# Patient Record
Sex: FEMALE | Race: WHITE | NOT HISPANIC OR LATINO | Employment: FULL TIME | URBAN - METROPOLITAN AREA
[De-identification: names, ages, dates, MRNs, and addresses within clinical notes are randomized per-mention and may not be internally consistent; named-entity substitution may affect disease eponyms.]

---

## 2019-01-16 ENCOUNTER — OFFICE VISIT (OUTPATIENT)
Dept: URGENT CARE | Facility: CLINIC | Age: 69
End: 2019-01-16
Payer: COMMERCIAL

## 2019-01-16 VITALS
DIASTOLIC BLOOD PRESSURE: 74 MMHG | OXYGEN SATURATION: 96 % | SYSTOLIC BLOOD PRESSURE: 159 MMHG | RESPIRATION RATE: 16 BRPM | TEMPERATURE: 98.2 F | HEART RATE: 86 BPM

## 2019-01-16 DIAGNOSIS — N30.01 ACUTE CYSTITIS WITH HEMATURIA: Primary | ICD-10-CM

## 2019-01-16 LAB
SL AMB  POCT GLUCOSE, UA: NEGATIVE
SL AMB LEUKOCYTE ESTERASE,UA: ABNORMAL
SL AMB POCT BILIRUBIN,UA: NEGATIVE
SL AMB POCT BLOOD,UA: ABNORMAL
SL AMB POCT CLARITY,UA: ABNORMAL
SL AMB POCT COLOR,UA: ABNORMAL
SL AMB POCT KETONES,UA: NEGATIVE
SL AMB POCT NITRITE,UA: NEGATIVE
SL AMB POCT PH,UA: 5
SL AMB POCT SPECIFIC GRAVITY,UA: 1.03
SL AMB POCT URINE PROTEIN: NEGATIVE
SL AMB POCT UROBILINOGEN: 0.2

## 2019-01-16 PROCEDURE — 87147 CULTURE TYPE IMMUNOLOGIC: CPT | Performed by: PHYSICIAN ASSISTANT

## 2019-01-16 PROCEDURE — 87077 CULTURE AEROBIC IDENTIFY: CPT | Performed by: PHYSICIAN ASSISTANT

## 2019-01-16 PROCEDURE — 99213 OFFICE O/P EST LOW 20 MIN: CPT | Performed by: PHYSICIAN ASSISTANT

## 2019-01-16 PROCEDURE — 87186 SC STD MICRODIL/AGAR DIL: CPT | Performed by: PHYSICIAN ASSISTANT

## 2019-01-16 PROCEDURE — 81002 URINALYSIS NONAUTO W/O SCOPE: CPT | Performed by: PHYSICIAN ASSISTANT

## 2019-01-16 PROCEDURE — 87086 URINE CULTURE/COLONY COUNT: CPT | Performed by: PHYSICIAN ASSISTANT

## 2019-01-16 RX ORDER — ESOMEPRAZOLE MAGNESIUM 40 MG/1
CAPSULE, DELAYED RELEASE ORAL
COMMUNITY

## 2019-01-16 RX ORDER — PHENAZOPYRIDINE HYDROCHLORIDE 100 MG/1
100 TABLET, FILM COATED ORAL 3 TIMES DAILY PRN
Qty: 10 TABLET | Refills: 0 | Status: SHIPPED | OUTPATIENT
Start: 2019-01-16

## 2019-01-16 RX ORDER — SPIRONOLACTONE 50 MG/1
TABLET, FILM COATED ORAL
COMMUNITY
Start: 2014-06-07

## 2019-01-16 RX ORDER — ROSUVASTATIN CALCIUM 10 MG/1
TABLET, COATED ORAL
COMMUNITY
Start: 2014-08-21

## 2019-01-16 RX ORDER — TAMOXIFEN CITRATE 20 MG/1
TABLET ORAL
COMMUNITY
Start: 2018-02-06

## 2019-01-16 RX ORDER — PRAMIPEXOLE 0.38 MG/1
TABLET, EXTENDED RELEASE ORAL
COMMUNITY
Start: 2016-09-13

## 2019-01-16 RX ORDER — PRAMIPEXOLE DIHYDROCHLORIDE 0.25 MG/1
TABLET ORAL
COMMUNITY

## 2019-01-16 RX ORDER — DIGOXIN 250 MCG
TABLET ORAL
COMMUNITY
Start: 2016-08-14

## 2019-01-16 RX ORDER — NITROFURANTOIN 25; 75 MG/1; MG/1
100 CAPSULE ORAL 2 TIMES DAILY
Qty: 14 CAPSULE | Refills: 0 | Status: SHIPPED | OUTPATIENT
Start: 2019-01-16 | End: 2019-01-23

## 2019-01-16 RX ORDER — ALPRAZOLAM 0.25 MG/1
TABLET ORAL
COMMUNITY
Start: 2018-07-15

## 2019-01-16 NOTE — PATIENT INSTRUCTIONS
Urinalysis performed in office-consistent with urinary tract infection  Urine will be sent for cultures  Prescription sent to pharmacy for Macrobid x7 days and Pyridium for recurrent urinary tract infections  Increase oral fluid intake  Follow up with PCP in 3-5 days  Proceed to  ER if symptoms worsen  Urinary Tract Infection in Women   AMBULATORY CARE:   A urinary tract infection (UTI)  is caused by bacteria that get inside your urinary tract  Most bacteria that enter your urinary tract come out when you urinate  If the bacteria stay in your urinary tract, you may get an infection  Your urinary tract includes your kidneys, ureters, bladder, and urethra  Urine is made in your kidneys, and it flows from the ureters to the bladder  Urine leaves the bladder through the urethra  A UTI is more common in your lower urinary tract, which includes your bladder and urethra  Common symptoms include the following:   · Urinating more often or waking from sleep to urinate    · Pain or burning when you urinate    · Pain or pressure in your lower abdomen     · Urine that smells bad    · Blood in your urine    · Leaking urine  Seek care immediately if:   · You are urinating very little or not at all  · You have a high fever with shaking chills  · You have side or back pain that gets worse  Contact your healthcare provider if:   · You have a fever  · You do not feel better after 2 days of taking antibiotics  · You are vomiting  · You have questions or concerns about your condition or care  Treatment for a UTI  may include medicines to treat a bacterial infection  You may also need medicines to decrease pain and burning, or decrease the urge to urinate often  Prevent a UTI:   · Empty your bladder often  Urinate and empty your bladder as soon as you feel the need  Do not hold your urine for long periods of time  · Wipe from front to back after you urinate or have a bowel movement    This will help prevent germs from getting into your urinary tract through your urethra  · Drink liquids as directed  Ask how much liquid to drink each day and which liquids are best for you  You may need to drink more liquids than usual to help flush out the bacteria  Do not drink alcohol, caffeine, or citrus juices  These can irritate your bladder and increase your symptoms  Your healthcare provider may recommend cranberry juice to help prevent a UTI  · Urinate after you have sex  This can help flush out bacteria passed during sex  · Do not douche or use feminine deodorants  These can change the chemical balance in your vagina  · Change sanitary pads or tampons often  This will help prevent germs from getting into your urinary tract  · Do pelvic muscle exercises often  Pelvic muscle exercises may help you start and stop urinating  Strong pelvic muscles may help you empty your bladder easier  Squeeze these muscles tightly for 5 seconds like you are trying to hold back urine  Then relax for 5 seconds  Gradually work up to squeezing for 10 seconds  Do 3 sets of 15 repetitions a day, or as directed  Follow up with your healthcare provider as directed:  Write down your questions so you remember to ask them during your visits  © 2017 [address] [employer] Information is for End User's use only and may not be sold, redistributed or otherwise used for commercial purposes  All illustrations and images included in CareNotes® are the copyrighted property of [employer]  or Arya Thurston  The above information is an  only  It is not intended as medical advice for individual conditions or treatments  Talk to your doctor, nurse or pharmacist before following any medical regimen to see if it is safe and effective for you

## 2019-01-16 NOTE — PROGRESS NOTES
[facility]        NAME: Corey Mobley is a 79 y o  female  : 1950    MRN: [id number]  DATE: 2019  TIME: 9:04 AM    Assessment and Plan   Acute cystitis with hematuria [N30 01]  1  Acute cystitis with hematuria  nitrofurantoin (MACROBID) 100 mg capsule    POCT urine dip    Urine culture    phenazopyridine (PYRIDIUM) 100 mg tablet         Patient Instructions   Urinalysis performed in office-consistent with urinary tract infection  Urine will be sent for cultures  Prescription sent to pharmacy for Macrobid x7 days and Pyridium for recurrent urinary tract infections  Increase oral fluid intake  Follow up with PCP in 3-5 days  Proceed to  ER if symptoms worsen  Chief Complaint     Chief Complaint   Patient presents with    Possible UTI     x 1 day, burning, pressure          History of Present Illness   The patient is a 74-year-old female who presents with urinary symptoms for 1 day  She states that she has had recurrent urinary tract infections and has been treated with antibiotics for approximately 6 UTIs this year  She does admit to urinary incontinence  Positive bladder spasms  She has never seen a urologist for these issues  Starting yesterday she developed urinary frequency, decreased urine output, pain and spasms with urination  She denies seeing any blood in her urine  Negative flank pain  Negative nausea or vomiting  Negative abdominal pain  Negative fever or chills  HPI    Review of Systems   Review of Systems   Constitutional: Negative for activity change, chills and fever  Gastrointestinal: Negative for abdominal distention, abdominal pain, blood in stool, constipation, diarrhea, nausea and vomiting  Genitourinary: Positive for decreased urine volume, dysuria, frequency and urgency   Negative for difficulty urinating, dyspareunia, flank pain, genital sores, hematuria, menstrual problem, pelvic pain, vaginal bleeding, vaginal discharge and vaginal pain    Musculoskeletal: Negative for arthralgias and myalgias  Skin: Negative for rash  Neurological: Negative for dizziness, light-headedness and headaches  All other systems reviewed and are negative  Current Medications       Current Outpatient Prescriptions:     ALPRAZolam (XANAX) 0 25 mg tablet, , Disp: , Rfl:     aspirin 81 MG tablet, Take by mouth, Disp: , Rfl:     digoxin (LANOXIN) 0 25 mg tablet, TAKE 1 TABLET BY MOUTH EVERY DAY, Disp: , Rfl:     esomeprazole (NEXIUM) 40 MG capsule, Take by mouth, Disp: , Rfl:     pramipexole (MIRAPEX) 0 25 mg tablet, Take by mouth, Disp: , Rfl:     Pramipexole Dihydrochloride ER 0 375 MG TB24, TAKE 1 TABLET BY MOUTH EVERY DAY, Disp: , Rfl:     rosuvastatin (CRESTOR) 10 MG tablet, Take by mouth, Disp: , Rfl:     spironolactone (ALDACTONE) 50 mg tablet, Take by mouth, Disp: , Rfl:     tamoxifen (NOLVADEX) 20 mg tablet, TAIKE 1 TABLET BY MOUTH DAILY, Disp: , Rfl:     nitrofurantoin (MACROBID) 100 mg capsule, Take 1 capsule (100 mg total) by mouth 2 (two) times a day for 7 days, Disp: 14 capsule, Rfl: 0    phenazopyridine (PYRIDIUM) 100 mg tablet, Take 1 tablet (100 mg total) by mouth 3 (three) times a day as needed for bladder spasms, Disp: 10 tablet, Rfl: 0    Current Allergies     Allergies as of 01/16/2019 - Reviewed 01/16/2019   Allergen Reaction Noted    Celecoxib  09/19/2016    Neomycin-bacitracin zn-polymyx Other (See Comments) 09/18/2014    Rofecoxib  09/18/2014    Tape  [medical tape]  09/18/2014    Tetanus antitoxin  09/18/2014            The following portions of the patient's history were reviewed and updated as appropriate: allergies, current medications, past family history, past medical history, past social history, past surgical history and problem list      History reviewed  No pertinent past medical history      Past Surgical History:   Procedure Laterality Date    MASTECTOMY Right 2016       No family history on file       Medications have been verified  Objective   /74 (BP Location: Left arm, Patient Position: Sitting, Cuff Size: Large)   Pulse 86   Temp 98 2 °F (36 8 °C) (Oral)   Resp 16   SpO2 96%        Physical Exam     Physical Exam   Constitutional: She appears well-developed and well-nourished  Non-toxic appearance  She does not have a sickly appearance  She does not appear ill  No distress  Abdominal: Soft  Normal appearance and bowel sounds are normal  She exhibits no shifting dullness, no distension, no pulsatile liver, no fluid wave, no abdominal bruit, no ascites, no pulsatile midline mass and no mass  There is no hepatosplenomegaly  There is no tenderness  There is no rigidity, no rebound, no guarding, no CVA tenderness, no tenderness at McBurney's point and negative Roger's sign  Skin: She is not diaphoretic  Nursing note and vitals reviewed

## 2019-01-18 LAB
BACTERIA UR CULT: ABNORMAL
BACTERIA UR CULT: ABNORMAL

## 2019-03-02 ENCOUNTER — OFFICE VISIT (OUTPATIENT)
Dept: URGENT CARE | Facility: CLINIC | Age: 69
End: 2019-03-02
Payer: COMMERCIAL

## 2019-03-02 VITALS
RESPIRATION RATE: 22 BRPM | OXYGEN SATURATION: 98 % | TEMPERATURE: 99.4 F | HEART RATE: 91 BPM | DIASTOLIC BLOOD PRESSURE: 65 MMHG | SYSTOLIC BLOOD PRESSURE: 150 MMHG

## 2019-03-02 DIAGNOSIS — J04.0 ACUTE LARYNGITIS: ICD-10-CM

## 2019-03-02 DIAGNOSIS — R09.82 POST-NASAL DRIP: ICD-10-CM

## 2019-03-02 DIAGNOSIS — J02.9 SORE THROAT: Primary | ICD-10-CM

## 2019-03-02 LAB — S PYO AG THROAT QL: NEGATIVE

## 2019-03-02 PROCEDURE — S9083 URGENT CARE CENTER GLOBAL: HCPCS | Performed by: NURSE PRACTITIONER

## 2019-03-02 PROCEDURE — 87430 STREP A AG IA: CPT | Performed by: NURSE PRACTITIONER

## 2019-03-02 PROCEDURE — G0382 LEV 3 HOSP TYPE B ED VISIT: HCPCS | Performed by: NURSE PRACTITIONER

## 2019-03-02 NOTE — PROGRESS NOTES
[facility]        NAME: Lucrecia Wilson is a 79 y o  female  : 1950    MRN: [id number]  DATE: 2019  TIME: 3:36 PM    Assessment and Plan   Sore throat [J02 9]  1  Sore throat  POCT rapid strepA   2  Acute laryngitis     3  Post-nasal drip           Patient Instructions   1  Flonase 1 spray each nostril daily   2  Throat lozenges, honey, salt water gargles for pain relief  3  Increase fluid intake   4  Tylenol/Motrin as needed for pain/fever  5  Follow up with your PCP for worsening or concerning symptoms     Follow up with PCP in 3-5 days  Proceed to  ER if symptoms worsen  Chief Complaint     Chief Complaint   Patient presents with    Cough     nasal congestion, sore throat x 6 days          History of Present Illness       Patient is a 42-year-old female presenting with a hoarse voice and sore throat for the past 6 days  Associated with decreased appetite today  She denies fever, chills, abdominal pain, nausea, vomiting, or diarrhea  She is not using any over-the-counter treatments for symptomatic relief  Review of Systems   Review of Systems   Constitutional: Positive for appetite change  Negative for activity change, chills and fever  HENT: Positive for postnasal drip, sore throat and voice change  Respiratory: Positive for cough  Negative for shortness of breath  Gastrointestinal: Negative for abdominal pain, diarrhea, nausea and vomiting  Skin: Negative for rash           Current Medications       Current Outpatient Medications:     ALPRAZolam (XANAX) 0 25 mg tablet, , Disp: , Rfl:     aspirin 81 MG tablet, Take by mouth, Disp: , Rfl:     digoxin (LANOXIN) 0 25 mg tablet, TAKE 1 TABLET BY MOUTH EVERY DAY, Disp: , Rfl:     esomeprazole (NEXIUM) 40 MG capsule, Take by mouth, Disp: , Rfl:     phenazopyridine (PYRIDIUM) 100 mg tablet, Take 1 tablet (100 mg total) by mouth 3 (three) times a day as needed for bladder spasms, Disp: 10 tablet, Rfl: 0   pramipexole (MIRAPEX) 0 25 mg tablet, Take by mouth, Disp: , Rfl:     Pramipexole Dihydrochloride ER 0 375 MG TB24, TAKE 1 TABLET BY MOUTH EVERY DAY, Disp: , Rfl:     rosuvastatin (CRESTOR) 10 MG tablet, Take by mouth, Disp: , Rfl:     spironolactone (ALDACTONE) 50 mg tablet, Take by mouth, Disp: , Rfl:     tamoxifen (NOLVADEX) 20 mg tablet, TAIKE 1 TABLET BY MOUTH DAILY, Disp: , Rfl:     Current Allergies     Allergies as of 03/02/2019 - Reviewed 03/02/2019   Allergen Reaction Noted    Celecoxib  09/19/2016    Neomycin-bacitracin zn-polymyx Other (See Comments) 09/18/2014    Rofecoxib  09/18/2014    Tape  [medical tape]  09/18/2014    Tetanus antitoxin  09/18/2014            The following portions of the patient's history were reviewed and updated as appropriate: allergies, current medications, past family history, past medical history, past social history, past surgical history and problem list      History reviewed  No pertinent past medical history  Past Surgical History:   Procedure Laterality Date    MASTECTOMY Right 2016       Family History   Problem Relation Age of Onset    Arthritis Mother     Heart disease Father          Medications have been verified  Objective   /65 (BP Location: Right arm, Patient Position: Sitting, Cuff Size: Large)   Pulse 91   Temp 99 4 °F (37 4 °C) (Tympanic)   Resp 22   SpO2 98%       rapid strep:  Negative  Physical Exam     Physical Exam   Constitutional: She is oriented to person, place, and time  Vital signs are normal  She appears well-developed and well-nourished  She is active  No distress  HENT:   Head: Normocephalic  Right Ear: Tympanic membrane, external ear and ear canal normal    Left Ear: Tympanic membrane, external ear and ear canal normal    Nose: Nose normal    Mouth/Throat: Oropharynx is clear and moist and mucous membranes are normal  No oropharyngeal exudate, posterior oropharyngeal edema or posterior oropharyngeal erythema  Eyes: Pupils are equal, round, and reactive to light  Conjunctivae are normal    Cardiovascular: Normal rate, regular rhythm, S1 normal, S2 normal and normal heart sounds  No murmur heard  Pulmonary/Chest: Breath sounds normal  No respiratory distress  She has no decreased breath sounds  She has no wheezes  She has no rhonchi  She has no rales  Neurological: She is alert and oriented to person, place, and time  GCS eye subscore is 4  GCS verbal subscore is 5  GCS motor subscore is 6  Skin: Skin is warm and dry

## 2019-03-02 NOTE — PATIENT INSTRUCTIONS
1  Flonase 1 spray each nostril daily   2  Throat lozenges, honey, salt water gargles for pain relief  3  Increase fluid intake   4  Tylenol/Motrin as needed for pain/fever  5  Follow up with your PCP for worsening or concerning symptoms     Postnasal Drip   AMBULATORY CARE:   Postnasal drip  is a condition that causes a large amount of mucus to collect in your throat or nose  It may also be called upper airway cough syndrome because the mucus causes repeated coughing  You may have a sore throat, or throat tissues may swell  This may feel like a lump in your throat  You may also feel like you need to clear your throat often  Contact your healthcare provider if:   · You have trouble breathing because of the mucus  · You have new or worsening symptoms, even with treatment  · You have signs of an infection, such as yellow or green mucus, or a fever  · You have questions or concerns about your condition or care  Treatment  may include any of the following:  · Medicines  may be given to thin the mucus  You may need to swallow the medicine or use a device to flush your sinuses with liquid squirted into your nose  Nasal sprays may also be needed to keep the tissues in your nose moist  Medicines can also relieve congestion  Allergy medicine may help if your symptoms are caused by seasonal allergies, such as hay fever  You may need medicine to help control GERD  · Antibiotics  may be needed to treat a bacterial infection  Manage postnasal drip:   · Use a humidifier or vaporizer  Use a cool mist humidifier or a vaporizer to increase air moisture in your home  This may make it easier for you to breathe  · Drink more liquids as directed  Liquids help keep your air passages moist and help you cough up mucus  Ask how much liquid to drink each day and which liquids are best for you  · Avoid cold air and dry, heated air  Cold or dry air can trigger postnasal drip   Try to stay inside on cold days, or keep your mouth covered  Do not stay long in areas that have dry, heated air  · Do not smoke, and avoid secondhand smoke  Nicotine and other chemicals in cigarettes and cigars can irritate your throat and make coughing worse  Ask your healthcare provider for information if you currently smoke and need help to quit  E-cigarettes or smokeless tobacco still contain nicotine  Talk to your healthcare provider before you use these products  Follow up with your healthcare provider as directed:  Write down your questions so you remember to ask them during your visits  © 2017 [facility] Information is for End User's use only and may not be sold, redistributed or otherwise used for commercial purposes  All illustrations and images included in CareNotes® are the copyrighted property of A [employer] M [employer] Inc  or Arya Thurston  The above information is an  only  It is not intended as medical advice for individual conditions or treatments  Talk to your doctor, nurse or pharmacist before following any medical regimen to see if it is safe and effective for you

## 2019-05-04 ENCOUNTER — APPOINTMENT (OUTPATIENT)
Dept: RADIOLOGY | Facility: CLINIC | Age: 69
End: 2019-05-04
Payer: COMMERCIAL

## 2019-05-04 ENCOUNTER — TRANSCRIBE ORDERS (OUTPATIENT)
Dept: URGENT CARE | Facility: CLINIC | Age: 69
End: 2019-05-04

## 2019-05-04 DIAGNOSIS — J18.9 PNEUMONIA OF LEFT LUNG DUE TO INFECTIOUS ORGANISM, UNSPECIFIED PART OF LUNG: ICD-10-CM

## 2019-05-04 DIAGNOSIS — J18.9 PNEUMONIA OF LEFT LUNG DUE TO INFECTIOUS ORGANISM, UNSPECIFIED PART OF LUNG: Primary | ICD-10-CM

## 2019-05-04 PROCEDURE — 71046 X-RAY EXAM CHEST 2 VIEWS: CPT

## 2019-05-13 ENCOUNTER — TRANSCRIBE ORDERS (OUTPATIENT)
Dept: LAB | Facility: CLINIC | Age: 69
End: 2019-05-13

## 2019-05-13 ENCOUNTER — APPOINTMENT (OUTPATIENT)
Dept: LAB | Facility: CLINIC | Age: 69
End: 2019-05-13
Payer: COMMERCIAL

## 2019-05-13 DIAGNOSIS — I48.0 PAROXYSMAL ATRIAL FIBRILLATION (HCC): ICD-10-CM

## 2019-05-13 DIAGNOSIS — F33.1 MAJOR DEPRESSIVE DISORDER, RECURRENT EPISODE, MODERATE (HCC): ICD-10-CM

## 2019-05-13 DIAGNOSIS — I10 ESSENTIAL HYPERTENSION, MALIGNANT: ICD-10-CM

## 2019-05-13 DIAGNOSIS — J04.0 ACUTE EDEMATOUS LARYNGITIS: ICD-10-CM

## 2019-05-13 DIAGNOSIS — F45.8 ANXIETY HYPERVENTILATION: ICD-10-CM

## 2019-05-13 DIAGNOSIS — F41.9 ANXIETY HYPERVENTILATION: ICD-10-CM

## 2019-05-13 DIAGNOSIS — H66.41 SUPPURATIVE OTITIS MEDIA OF RIGHT EAR, UNSPECIFIED CHRONICITY: ICD-10-CM

## 2019-05-13 DIAGNOSIS — H66.41 SUPPURATIVE OTITIS MEDIA OF RIGHT EAR, UNSPECIFIED CHRONICITY: Primary | ICD-10-CM

## 2019-05-13 LAB
25(OH)D3 SERPL-MCNC: 23.3 NG/ML (ref 30–100)
ALBUMIN SERPL BCP-MCNC: 3.4 G/DL (ref 3.5–5)
ALP SERPL-CCNC: 62 U/L (ref 46–116)
ALT SERPL W P-5'-P-CCNC: 54 U/L (ref 12–78)
ANION GAP SERPL CALCULATED.3IONS-SCNC: 6 MMOL/L (ref 4–13)
AST SERPL W P-5'-P-CCNC: 50 U/L (ref 5–45)
BASOPHILS # BLD AUTO: 0.06 THOUSANDS/ΜL (ref 0–0.1)
BASOPHILS NFR BLD AUTO: 1 % (ref 0–1)
BILIRUB SERPL-MCNC: 0.48 MG/DL (ref 0.2–1)
BUN SERPL-MCNC: 12 MG/DL (ref 5–25)
CALCIUM SERPL-MCNC: 8.5 MG/DL (ref 8.3–10.1)
CHLORIDE SERPL-SCNC: 114 MMOL/L (ref 100–108)
CHOLEST SERPL-MCNC: 111 MG/DL (ref 50–200)
CO2 SERPL-SCNC: 23 MMOL/L (ref 21–32)
CREAT SERPL-MCNC: 0.73 MG/DL (ref 0.6–1.3)
DIGOXIN SERPL-MCNC: 0.5 NG/ML (ref 0.8–2)
EOSINOPHIL # BLD AUTO: 0.12 THOUSAND/ΜL (ref 0–0.61)
EOSINOPHIL NFR BLD AUTO: 2 % (ref 0–6)
ERYTHROCYTE [DISTWIDTH] IN BLOOD BY AUTOMATED COUNT: 14 % (ref 11.6–15.1)
GFR SERPL CREATININE-BSD FRML MDRD: 86 ML/MIN/1.73SQ M
GLUCOSE P FAST SERPL-MCNC: 101 MG/DL (ref 65–99)
HCT VFR BLD AUTO: 40.7 % (ref 34.8–46.1)
HDLC SERPL-MCNC: 48 MG/DL (ref 40–60)
HGB BLD-MCNC: 13.2 G/DL (ref 11.5–15.4)
IMM GRANULOCYTES # BLD AUTO: 0.03 THOUSAND/UL (ref 0–0.2)
IMM GRANULOCYTES NFR BLD AUTO: 0 % (ref 0–2)
LDLC SERPL CALC-MCNC: 49 MG/DL (ref 0–100)
LYMPHOCYTES # BLD AUTO: 2.16 THOUSANDS/ΜL (ref 0.6–4.47)
LYMPHOCYTES NFR BLD AUTO: 27 % (ref 14–44)
MCH RBC QN AUTO: 28.9 PG (ref 26.8–34.3)
MCHC RBC AUTO-ENTMCNC: 32.4 G/DL (ref 31.4–37.4)
MCV RBC AUTO: 89 FL (ref 82–98)
MONOCYTES # BLD AUTO: 0.32 THOUSAND/ΜL (ref 0.17–1.22)
MONOCYTES NFR BLD AUTO: 4 % (ref 4–12)
NEUTROPHILS # BLD AUTO: 5.26 THOUSANDS/ΜL (ref 1.85–7.62)
NEUTS SEG NFR BLD AUTO: 66 % (ref 43–75)
NONHDLC SERPL-MCNC: 63 MG/DL
NRBC BLD AUTO-RTO: 0 /100 WBCS
PLATELET # BLD AUTO: 145 THOUSANDS/UL (ref 149–390)
PMV BLD AUTO: 11.9 FL (ref 8.9–12.7)
POTASSIUM SERPL-SCNC: 4.1 MMOL/L (ref 3.5–5.3)
PROT SERPL-MCNC: 7 G/DL (ref 6.4–8.2)
RBC # BLD AUTO: 4.57 MILLION/UL (ref 3.81–5.12)
SODIUM SERPL-SCNC: 143 MMOL/L (ref 136–145)
TRIGL SERPL-MCNC: 71 MG/DL
TSH SERPL DL<=0.05 MIU/L-ACNC: 2.8 UIU/ML (ref 0.36–3.74)
WBC # BLD AUTO: 7.95 THOUSAND/UL (ref 4.31–10.16)

## 2019-05-13 PROCEDURE — 82306 VITAMIN D 25 HYDROXY: CPT

## 2019-05-13 PROCEDURE — 80061 LIPID PANEL: CPT

## 2019-05-13 PROCEDURE — 84443 ASSAY THYROID STIM HORMONE: CPT

## 2019-05-13 PROCEDURE — 36415 COLL VENOUS BLD VENIPUNCTURE: CPT

## 2019-05-13 PROCEDURE — 85025 COMPLETE CBC W/AUTO DIFF WBC: CPT

## 2019-05-13 PROCEDURE — 80053 COMPREHEN METABOLIC PANEL: CPT

## 2019-05-13 PROCEDURE — 80162 ASSAY OF DIGOXIN TOTAL: CPT

## 2020-08-10 ENCOUNTER — TRANSCRIBE ORDERS (OUTPATIENT)
Dept: LAB | Facility: CLINIC | Age: 70
End: 2020-08-10

## 2020-08-10 ENCOUNTER — APPOINTMENT (OUTPATIENT)
Dept: LAB | Facility: CLINIC | Age: 70
End: 2020-08-10
Payer: COMMERCIAL

## 2020-08-10 DIAGNOSIS — Z17.0 ESTROGEN RECEPTOR POSITIVE: ICD-10-CM

## 2020-08-10 DIAGNOSIS — C50.511 MALIGNANT NEOPLASM OF LOWER-OUTER QUADRANT OF RIGHT FEMALE BREAST, UNSPECIFIED ESTROGEN RECEPTOR STATUS (HCC): Primary | ICD-10-CM

## 2020-08-10 DIAGNOSIS — C50.511 MALIGNANT NEOPLASM OF LOWER-OUTER QUADRANT OF RIGHT FEMALE BREAST, UNSPECIFIED ESTROGEN RECEPTOR STATUS (HCC): ICD-10-CM

## 2020-08-10 LAB
ALBUMIN SERPL BCP-MCNC: 3.5 G/DL (ref 3.5–5)
ALP SERPL-CCNC: 74 U/L (ref 46–116)
ALT SERPL W P-5'-P-CCNC: 66 U/L (ref 12–78)
ANION GAP SERPL CALCULATED.3IONS-SCNC: 7 MMOL/L (ref 4–13)
AST SERPL W P-5'-P-CCNC: 72 U/L (ref 5–45)
BACTERIA UR QL AUTO: ABNORMAL /HPF
BASOPHILS # BLD AUTO: 0.08 THOUSANDS/ΜL (ref 0–0.1)
BASOPHILS NFR BLD AUTO: 1 % (ref 0–1)
BILIRUB SERPL-MCNC: 0.46 MG/DL (ref 0.2–1)
BILIRUB UR QL STRIP: NEGATIVE
BUN SERPL-MCNC: 8 MG/DL (ref 5–25)
CALCIUM SERPL-MCNC: 8.7 MG/DL (ref 8.3–10.1)
CANCER AG27-29 SERPL-ACNC: 12.2 U/ML (ref 0–42.3)
CHLORIDE SERPL-SCNC: 108 MMOL/L (ref 100–108)
CLARITY UR: CLEAR
CO2 SERPL-SCNC: 24 MMOL/L (ref 21–32)
COLOR UR: YELLOW
CREAT SERPL-MCNC: 0.74 MG/DL (ref 0.6–1.3)
EOSINOPHIL # BLD AUTO: 0.17 THOUSAND/ΜL (ref 0–0.61)
EOSINOPHIL NFR BLD AUTO: 2 % (ref 0–6)
ERYTHROCYTE [DISTWIDTH] IN BLOOD BY AUTOMATED COUNT: 13.8 % (ref 11.6–15.1)
GFR SERPL CREATININE-BSD FRML MDRD: 84 ML/MIN/1.73SQ M
GGT SERPL-CCNC: 60 U/L (ref 5–85)
GLUCOSE P FAST SERPL-MCNC: 111 MG/DL (ref 65–99)
GLUCOSE UR STRIP-MCNC: NEGATIVE MG/DL
HCT VFR BLD AUTO: 44.9 % (ref 34.8–46.1)
HGB BLD-MCNC: 14.4 G/DL (ref 11.5–15.4)
HGB UR QL STRIP.AUTO: NEGATIVE
HYALINE CASTS #/AREA URNS LPF: ABNORMAL /LPF
IMM GRANULOCYTES # BLD AUTO: 0.03 THOUSAND/UL (ref 0–0.2)
IMM GRANULOCYTES NFR BLD AUTO: 0 % (ref 0–2)
KETONES UR STRIP-MCNC: NEGATIVE MG/DL
LEUKOCYTE ESTERASE UR QL STRIP: NEGATIVE
LYMPHOCYTES # BLD AUTO: 3.31 THOUSANDS/ΜL (ref 0.6–4.47)
LYMPHOCYTES NFR BLD AUTO: 32 % (ref 14–44)
MCH RBC QN AUTO: 29.3 PG (ref 26.8–34.3)
MCHC RBC AUTO-ENTMCNC: 32.1 G/DL (ref 31.4–37.4)
MCV RBC AUTO: 91 FL (ref 82–98)
MONOCYTES # BLD AUTO: 0.56 THOUSAND/ΜL (ref 0.17–1.22)
MONOCYTES NFR BLD AUTO: 6 % (ref 4–12)
NEUTROPHILS # BLD AUTO: 6.11 THOUSANDS/ΜL (ref 1.85–7.62)
NEUTS SEG NFR BLD AUTO: 59 % (ref 43–75)
NITRITE UR QL STRIP: NEGATIVE
NON-SQ EPI CELLS URNS QL MICRO: ABNORMAL /HPF
NRBC BLD AUTO-RTO: 0 /100 WBCS
PH UR STRIP.AUTO: 6 [PH]
PLATELET # BLD AUTO: 155 THOUSANDS/UL (ref 149–390)
PMV BLD AUTO: 13 FL (ref 8.9–12.7)
POTASSIUM SERPL-SCNC: 4.3 MMOL/L (ref 3.5–5.3)
PROT SERPL-MCNC: 7.3 G/DL (ref 6.4–8.2)
PROT UR STRIP-MCNC: ABNORMAL MG/DL
RBC # BLD AUTO: 4.91 MILLION/UL (ref 3.81–5.12)
RBC #/AREA URNS AUTO: ABNORMAL /HPF
SODIUM SERPL-SCNC: 139 MMOL/L (ref 136–145)
SP GR UR STRIP.AUTO: 1.02 (ref 1–1.03)
UROBILINOGEN UR QL STRIP.AUTO: 0.2 E.U./DL
WBC # BLD AUTO: 10.26 THOUSAND/UL (ref 4.31–10.16)
WBC #/AREA URNS AUTO: ABNORMAL /HPF

## 2020-08-10 PROCEDURE — 81001 URINALYSIS AUTO W/SCOPE: CPT

## 2020-08-10 PROCEDURE — 36415 COLL VENOUS BLD VENIPUNCTURE: CPT

## 2020-08-10 PROCEDURE — 87186 SC STD MICRODIL/AGAR DIL: CPT

## 2020-08-10 PROCEDURE — 82977 ASSAY OF GGT: CPT

## 2020-08-10 PROCEDURE — 80053 COMPREHEN METABOLIC PANEL: CPT

## 2020-08-10 PROCEDURE — 87086 URINE CULTURE/COLONY COUNT: CPT

## 2020-08-10 PROCEDURE — 85025 COMPLETE CBC W/AUTO DIFF WBC: CPT

## 2020-08-10 PROCEDURE — 86300 IMMUNOASSAY TUMOR CA 15-3: CPT

## 2020-08-10 PROCEDURE — 87077 CULTURE AEROBIC IDENTIFY: CPT

## 2020-08-13 LAB — BACTERIA UR CULT: ABNORMAL

## 2021-03-07 ENCOUNTER — NURSE TRIAGE (OUTPATIENT)
Dept: OTHER | Facility: OTHER | Age: 71
End: 2021-03-07

## 2021-03-07 DIAGNOSIS — Z20.828 EXPOSURE TO SARS VIRUS: ICD-10-CM

## 2021-03-07 DIAGNOSIS — Z20.828 EXPOSURE TO SARS VIRUS: Primary | ICD-10-CM

## 2021-03-07 PROCEDURE — U0005 INFEC AGEN DETEC AMPLI PROBE: HCPCS | Performed by: FAMILY MEDICINE

## 2021-03-07 PROCEDURE — U0003 INFECTIOUS AGENT DETECTION BY NUCLEIC ACID (DNA OR RNA); SEVERE ACUTE RESPIRATORY SYNDROME CORONAVIRUS 2 (SARS-COV-2) (CORONAVIRUS DISEASE [COVID-19]), AMPLIFIED PROBE TECHNIQUE, MAKING USE OF HIGH THROUGHPUT TECHNOLOGIES AS DESCRIBED BY CMS-2020-01-R: HCPCS | Performed by: FAMILY MEDICINE

## 2021-03-07 NOTE — TELEPHONE ENCOUNTER
Reason for Disposition   [1] CLOSE CONTACT COVID-19 EXPOSURE within last 14 days AND [2] NO symptoms    Answer Assessment - Initial Assessment Questions  1  Were you within 6 feet or less, for up to 15 minutes or more with a person that has a confirmed COVID-19 test?   Yes exposed to daughter    2  What was the date of your exposure? Pt lives in same household  3 Are you experiencing any symptoms attributed to the virus? (Assess for SOB, cough, fever, difficulty breathing)   Denies any symptoms     4  HIGH RISK: Do you have any history heart or lung conditions, weakened immune system, diabetes, Asthma, CHF, HIV, COPD, Chemo, renal failure, sickle cell, etc?   Denies    Protocols used: CORONAVIRUS (COVID-19) EXPOSURE-ADULT-AH    Pt states that the Department of Health told her that she needs to get tested today since she was exposed to daughter  Covid swab placed

## 2021-03-07 NOTE — TELEPHONE ENCOUNTER
Regarding: covid test request- asymptomatic-contact  ----- Message from [facility] sent at 3/7/2021 11:54 AM EST -----  "My mom was in direct contact with a covid positive patient  At this time no symptoms  "

## 2021-03-09 LAB — SARS-COV-2 N GENE RESP QL NAA+PROBE: NEGATIVE

## 2021-03-10 DIAGNOSIS — Z23 ENCOUNTER FOR IMMUNIZATION: ICD-10-CM

## 2021-05-24 ENCOUNTER — AMB VIDEO VISIT (OUTPATIENT)
Dept: OTHER | Facility: HOSPITAL | Age: 71
End: 2021-05-24
Payer: COMMERCIAL

## 2021-05-24 PROCEDURE — 99212 OFFICE O/P EST SF 10 MIN: CPT | Performed by: FAMILY MEDICINE

## 2021-05-24 NOTE — CARE ANYWHERE EVISITS
Visit Summary for Radha Jhaveri - Gender: Female - Date of Birth: [id number]  Date: [id number] - Duration: 9 minutes  Patient: Jose Jhaveri  Provider: Kaci Sunshine    Patient Contact Information  Address  [address]; [address] [facility] [address] [facility] [address]  [phone]    Visit Topics  My eyes are burning, itchy  red, and liquid is coming out : other [Added By: Self - 2021-05-24]    Triage Questions   What is your current physical address in the event of a medical emergency? Answer [address]  Are you allergic to any medications? Answer [Vioxx]  Are you now or could you be pregnant? Answer [No]  Do you have any immune system   compromise or chronic lung disease? Answer [No]  Do you have any vulnerable family members in the home (infant, pregnant, cancer, elderly)? Answer [No]     Conversation Transcripts  [0A][0A] [Notification] You are connected with Kaci Sunshine, Family Physician [0A][Notification] Radha Jhaveri is located in [place]  [0A][Notification] Radha Jhaveri has shared health history  Anmol Ceballos  [0A][Notification] Kaci Sunshine   has added a prescription [0A][Notification] Kaci Sunshine has added a prescription [0A][Notification] Kaci Sunshine has added a prescription  [0A]    Diagnosis  Acute atopic conjunctivitis, bilateral    Procedures  Value: [id number] Code: CPT-4 UNLISTED E&M SERVICE    Medications Prescribed    Benadryl  Dose : 1 capsule  Route : oral  Frequency : every day  Until directed to stop  Refills : 1  Instructions to the Pharmacist : Substitutions allowed    fluticasone  Dose : 2 sprays  Route : nasal  Frequency : every day  Until directed to stop  Refills : 0  Instructions to the Pharmacist : 1 ml = 1 unit  generic ok  Substitutions allowed    Pataday  Dose : 1 drop  Route : ophthalmic  Frequency : every day  Until directed to stop  Refills : 0  Instructions to the Pharmacist : generic ok  5 ml = 1 unit please    Substitutions allowed    Benadryl Allergy/Cold      Frequency :           Detrol LA      Frequency :           Norvasc      Frequency :           spironolactone      Frequency :           digoxin      Frequency :           aspirin      Frequency :           Cosamin DS      Frequency :           Colace      Frequency :           Crestor      Frequency :           pramipexole      Frequency :             Provider Notes  [0A][0A] Video visit[0A][0A][0A][0A]S: Watery, itchy eyes started last night  No purulent discharge  Also runny nose  No fever  No change in vision  [0A]She had been taking benadryl every night but she stopped it last night    [0A][0A][0A][0A][0A][0A]PMH: htn, hyperlipidemia, a fib, restless leg[0A][0A][0A][0A]Meds: norvasc, spironolactone, crestor, detrol , effexor [0A]TAMOXIFEN CITRATE 20 MG TABSPRAMIPEXOLE DIHYDROCHLORIDE[0A][0A][0A]Allergies: viox, celebrex, neosporin   cream, adhesive tape [0A][0A][0A][0A]O:  Alert, in no apparent distress[0A][0A]Talking and breathing normally  No respiratory distress  [0A][0A]Conjunctivae mildly injected  EOMI  Watery eyes  [0A][0A]No facial asymmetry  [0A][0A]Nose, lips   normal [0A][0A][0A][0A]A: Allergic conjunctivitis [0A][0A][0A][0A]P:  [0A][0A]Please follow up with another online visit or with your primary care provider, urgent care or ER if your symptoms change, persist or worsen  [0A][0A][0A][0A]I recommend that you   have an in-person exam with your primary care provider annually  [0A][0A]I recommend that you share a copy of this visit with your primary care provider to be included in your medical records  [0A][0A][0A][0A]Please call the pharmacy help line at [phone] if you have any questions about your prescriptions  [0A]    Electronically signed Devonte JimNPI [id number])

## 2021-05-26 ENCOUNTER — OFFICE VISIT (OUTPATIENT)
Dept: URGENT CARE | Facility: CLINIC | Age: 71
End: 2021-05-26
Payer: COMMERCIAL

## 2021-05-26 VITALS
SYSTOLIC BLOOD PRESSURE: 143 MMHG | RESPIRATION RATE: 16 BRPM | TEMPERATURE: 98.8 F | DIASTOLIC BLOOD PRESSURE: 64 MMHG | HEIGHT: 67 IN | WEIGHT: 230 LBS | BODY MASS INDEX: 36.1 KG/M2 | HEART RATE: 86 BPM

## 2021-05-26 DIAGNOSIS — L23.7 ALLERGIC CONTACT DERMATITIS DUE TO PLANTS, EXCEPT FOOD: ICD-10-CM

## 2021-05-26 DIAGNOSIS — H10.9 CONJUNCTIVITIS OF BOTH EYES, UNSPECIFIED CONJUNCTIVITIS TYPE: Primary | ICD-10-CM

## 2021-05-26 PROCEDURE — 99213 OFFICE O/P EST LOW 20 MIN: CPT | Performed by: NURSE PRACTITIONER

## 2021-05-26 RX ORDER — SULFACETAMIDE/PREDNISOLONE 10 %-0.2 %
1 SUSPENSION, DROPS(FINAL DOSAGE FORM)(ML) OPHTHALMIC (EYE)
Qty: 10 ML | Refills: 0 | Status: SHIPPED | OUTPATIENT
Start: 2021-05-26

## 2021-05-26 RX ORDER — VENLAFAXINE HYDROCHLORIDE 75 MG/1
75 CAPSULE, EXTENDED RELEASE ORAL DAILY
COMMUNITY

## 2021-05-26 RX ORDER — PREDNISONE 10 MG/1
TABLET ORAL
Qty: 24 TABLET | Refills: 0 | Status: SHIPPED | OUTPATIENT
Start: 2021-05-26

## 2021-05-26 NOTE — PATIENT INSTRUCTIONS
--Warm/cool compresses  --Rx eye drops and oral steroid as ordered  --Continue Zyrtec and Pataday eye drops as ordered previously  --Avoid further exposure with plants, other known precipitants  --F/u with eye doctor right away if no improvement/worsening over the next 24-48 hours with these measures

## 2021-05-26 NOTE — PROGRESS NOTES
[facility]        NAME: Saad Paz is a 78 y o  female  : 1950    MRN: [id number]  DATE: May 26, 2021  TIME: 4:28 PM    Assessment and Plan   Conjunctivitis of both eyes, unspecified conjunctivitis type [H10 9]  1  Conjunctivitis of both eyes, unspecified conjunctivitis type  predniSONE 10 mg tablet    sulfacetamide-prednisolone (BLEPHAMIDE) 10-0 2 % ophthalmic suspension   2  Allergic contact dermatitis due to plants, except food  predniSONE 10 mg tablet     --Suspect allergic conjunctivitis/contact dermatitis from plant exposure  --Hx neosporin allergy, but denies allergy to sulfa  Patient Instructions     --Warm/cool compresses  --Rx eye drops and oral steroid as ordered  --Continue Zyrtec and Pataday eye drops as ordered previously  --Avoid further exposure with plants, other known precipitants  --F/u with eye doctor right away if no improvement/worsening over the next 24-48 hours with these measures  Chief Complaint     Chief Complaint   Patient presents with    Allergies     both eyes red for 3 days, tearing, itching, puffy, stickiness  Had video visit  Told to take Benadryl  Using zyrtec and pataday drops  No change         History of Present Illness         Here with daughter for complaints of red, itchy, burning eyes x 3 days  Some tearing and crusting also  Mild nasal congestion, clear rhinorrhea, sneezing  Denies (known) history of spring allergies  Some redness of cheeks also, but non-tender  No purulent drainage, photophobia, vision changes  Thinks she may have come in contact with poison ivy, another suspicious plant in her back yard over the weekend  No fever, cough, dyspnea  Denies history of glaucoma, other underlying eye conditions  No history of autoimmune conditions  No contact use  No known FB  No recent change in hair products  Had video visit with PCP two days ago   Prescribed Zyrtec/Benadryl and Pataday, which she is taking, but doesn't seem to be helping much  No known infectious contacts  Review of Systems   Review of Systems   Constitutional: Negative for fever  HENT: Positive for rhinorrhea  Negative for sore throat  Eyes: Positive for discharge, redness and itching  Negative for photophobia, pain and visual disturbance  Skin: Positive for rash  Neurological: Negative for headaches           Current Medications       Current Outpatient Medications:     ALPRAZolam (XANAX) 0 25 mg tablet, , Disp: , Rfl:     amLODIPine Besylate (NORVASC PO), Take by mouth, Disp: , Rfl:     aspirin 81 MG tablet, Take by mouth, Disp: , Rfl:     digoxin (LANOXIN) 0 25 mg tablet, TAKE 1 TABLET BY MOUTH EVERY DAY, Disp: , Rfl:     esomeprazole (NEXIUM) 40 MG capsule, Take by mouth, Disp: , Rfl:     pramipexole (MIRAPEX) 0 25 mg tablet, Take by mouth, Disp: , Rfl:     Pramipexole Dihydrochloride ER 0 375 MG TB24, TAKE 1 TABLET BY MOUTH EVERY DAY, Disp: , Rfl:     rosuvastatin (CRESTOR) 10 MG tablet, Take by mouth, Disp: , Rfl:     spironolactone (ALDACTONE) 50 mg tablet, Take by mouth, Disp: , Rfl:     tamoxifen (NOLVADEX) 20 mg tablet, TAIKE 1 TABLET BY MOUTH DAILY, Disp: , Rfl:     venlafaxine (EFFEXOR-XR) 75 mg 24 hr capsule, Take 75 mg by mouth daily, Disp: , Rfl:     phenazopyridine (PYRIDIUM) 100 mg tablet, Take 1 tablet (100 mg total) by mouth 3 (three) times a day as needed for bladder spasms (Patient not taking: Reported on 5/26/2021), Disp: 10 tablet, Rfl: 0    predniSONE 10 mg tablet, TAKE 4 TAB PO DAILY X 3 DAYS, THEN 3 TAB DAILY X 2 DAYS, THEN 2 TAB DAILY X 2 DAYS, THEN 1 TAB DAILY X 2 DAYS, WITH FOOD, Disp: 24 tablet, Rfl: 0    sulfacetamide-prednisolone (BLEPHAMIDE) 10-0 2 % ophthalmic suspension, Administer 1 drop to both eyes every 3 (three) hours, Disp: 10 mL, Rfl: 0    Current Allergies     Allergies as of 05/26/2021 - Reviewed 05/26/2021   Allergen Reaction Noted    Celecoxib  09/19/2016    Neomycin-bacitracin zn-polymyx Other (See Comments) 09/18/2014    Rofecoxib  09/18/2014    Tape  [medical tape]  09/18/2014    Tetanus antitoxin  09/18/2014            The following portions of the patient's history were reviewed and updated as appropriate: allergies, current medications, past family history, past medical history, past social history, past surgical history and problem list      No past medical history on file  Past Surgical History:   Procedure Laterality Date    MASTECTOMY Right 2016       Family History   Problem Relation Age of Onset    Arthritis Mother     Heart disease Father          Medications have been verified  Objective   /64 (Patient Position: Sitting)   Pulse 86   Temp 98 8 °F (37 1 °C) (Temporal)   Resp 16   Ht 5' 7" (1 702 m)   Wt 104 kg (230 lb)   BMI 36 02 kg/m²   No LMP recorded  Patient is postmenopausal        Physical Exam     Physical Exam  Constitutional:       General: She is not in acute distress  Appearance: She is not ill-appearing, toxic-appearing or diaphoretic  HENT:      Right Ear: Tympanic membrane normal       Left Ear: Tympanic membrane normal       Nose: Congestion present  No rhinorrhea  Comments: Cheeks with mild, diffuse, erythema  NON-tender, however  Mouth/Throat:      Pharynx: No oropharyngeal exudate or posterior oropharyngeal erythema  Eyes:      General:         Right eye: No discharge  Left eye: Discharge present  Extraocular Movements: Extraocular movements intact  Pupils: Pupils are equal, round, and reactive to light  Comments: Moderate conjunctival and scleral injection with mild/moderate tearing bilaterally  Scant crusting  No ciliary/limbic flush  No sign of abrasion/FB/trauma  No globe tenderness  Pulmonary:      Effort: Pulmonary effort is normal    Neurological:      Mental Status: She is alert     Psychiatric:         Mood and Affect: Mood normal

## 2021-06-04 ENCOUNTER — APPOINTMENT (OUTPATIENT)
Dept: LAB | Facility: CLINIC | Age: 71
End: 2021-06-04
Payer: COMMERCIAL

## 2021-06-04 ENCOUNTER — TRANSCRIBE ORDERS (OUTPATIENT)
Dept: LAB | Facility: CLINIC | Age: 71
End: 2021-06-04

## 2021-06-04 DIAGNOSIS — Z17.0 MALIGNANT NEOPLASM OF LOWER-OUTER QUADRANT OF RIGHT BREAST OF FEMALE, ESTROGEN RECEPTOR POSITIVE (HCC): ICD-10-CM

## 2021-06-04 DIAGNOSIS — C50.511 MALIGNANT NEOPLASM OF LOWER-OUTER QUADRANT OF RIGHT BREAST OF FEMALE, ESTROGEN RECEPTOR POSITIVE (HCC): Primary | ICD-10-CM

## 2021-06-04 DIAGNOSIS — Z17.0 MALIGNANT NEOPLASM OF LOWER-OUTER QUADRANT OF RIGHT BREAST OF FEMALE, ESTROGEN RECEPTOR POSITIVE (HCC): Primary | ICD-10-CM

## 2021-06-04 DIAGNOSIS — C50.911 MALIGNANT NEOPLASM OF RIGHT FEMALE BREAST, UNSPECIFIED ESTROGEN RECEPTOR STATUS, UNSPECIFIED SITE OF BREAST (HCC): ICD-10-CM

## 2021-06-04 DIAGNOSIS — Z79.810 LONG TERM CURRENT USE OF SELECTIVE ESTROGEN RECEPTOR MODULATORS (SERMS): ICD-10-CM

## 2021-06-04 DIAGNOSIS — C50.511 MALIGNANT NEOPLASM OF LOWER-OUTER QUADRANT OF RIGHT BREAST OF FEMALE, ESTROGEN RECEPTOR POSITIVE (HCC): ICD-10-CM

## 2021-06-04 LAB
ALBUMIN SERPL BCP-MCNC: 3.3 G/DL (ref 3.5–5)
ALP SERPL-CCNC: 89 U/L (ref 46–116)
ALT SERPL W P-5'-P-CCNC: 85 U/L (ref 12–78)
ANION GAP SERPL CALCULATED.3IONS-SCNC: 7 MMOL/L (ref 4–13)
AST SERPL W P-5'-P-CCNC: 62 U/L (ref 5–45)
BASOPHILS # BLD AUTO: 0.06 THOUSANDS/ΜL (ref 0–0.1)
BASOPHILS NFR BLD AUTO: 1 % (ref 0–1)
BILIRUB SERPL-MCNC: 0.58 MG/DL (ref 0.2–1)
BUN SERPL-MCNC: 9 MG/DL (ref 5–25)
CALCIUM ALBUM COR SERPL-MCNC: 9.2 MG/DL (ref 8.3–10.1)
CALCIUM SERPL-MCNC: 8.6 MG/DL (ref 8.3–10.1)
CANCER AG27-29 SERPL-ACNC: 12 U/ML (ref 0–42.3)
CHLORIDE SERPL-SCNC: 106 MMOL/L (ref 100–108)
CO2 SERPL-SCNC: 26 MMOL/L (ref 21–32)
CREAT SERPL-MCNC: 0.78 MG/DL (ref 0.6–1.3)
EOSINOPHIL # BLD AUTO: 0.15 THOUSAND/ΜL (ref 0–0.61)
EOSINOPHIL NFR BLD AUTO: 1 % (ref 0–6)
ERYTHROCYTE [DISTWIDTH] IN BLOOD BY AUTOMATED COUNT: 14.7 % (ref 11.6–15.1)
GFR SERPL CREATININE-BSD FRML MDRD: 78 ML/MIN/1.73SQ M
GGT SERPL-CCNC: 90 U/L (ref 5–85)
GLUCOSE SERPL-MCNC: 218 MG/DL (ref 65–140)
HCT VFR BLD AUTO: 40.7 % (ref 34.8–46.1)
HGB BLD-MCNC: 13.1 G/DL (ref 11.5–15.4)
IMM GRANULOCYTES # BLD AUTO: 0.09 THOUSAND/UL (ref 0–0.2)
IMM GRANULOCYTES NFR BLD AUTO: 1 % (ref 0–2)
LYMPHOCYTES # BLD AUTO: 3.09 THOUSANDS/ΜL (ref 0.6–4.47)
LYMPHOCYTES NFR BLD AUTO: 28 % (ref 14–44)
MCH RBC QN AUTO: 29 PG (ref 26.8–34.3)
MCHC RBC AUTO-ENTMCNC: 32.2 G/DL (ref 31.4–37.4)
MCV RBC AUTO: 90 FL (ref 82–98)
MONOCYTES # BLD AUTO: 0.49 THOUSAND/ΜL (ref 0.17–1.22)
MONOCYTES NFR BLD AUTO: 5 % (ref 4–12)
NEUTROPHILS # BLD AUTO: 7.13 THOUSANDS/ΜL (ref 1.85–7.62)
NEUTS SEG NFR BLD AUTO: 64 % (ref 43–75)
NRBC BLD AUTO-RTO: 0 /100 WBCS
PLATELET # BLD AUTO: 109 THOUSANDS/UL (ref 149–390)
PMV BLD AUTO: 12.8 FL (ref 8.9–12.7)
POTASSIUM SERPL-SCNC: 3.6 MMOL/L (ref 3.5–5.3)
PROT SERPL-MCNC: 6.7 G/DL (ref 6.4–8.2)
RBC # BLD AUTO: 4.52 MILLION/UL (ref 3.81–5.12)
SODIUM SERPL-SCNC: 139 MMOL/L (ref 136–145)
WBC # BLD AUTO: 11.01 THOUSAND/UL (ref 4.31–10.16)

## 2021-06-04 PROCEDURE — 85025 COMPLETE CBC W/AUTO DIFF WBC: CPT

## 2021-06-04 PROCEDURE — 36415 COLL VENOUS BLD VENIPUNCTURE: CPT

## 2021-06-04 PROCEDURE — 86300 IMMUNOASSAY TUMOR CA 15-3: CPT

## 2021-06-04 PROCEDURE — 82977 ASSAY OF GGT: CPT

## 2021-06-04 PROCEDURE — 80053 COMPREHEN METABOLIC PANEL: CPT

## 2021-07-07 ENCOUNTER — APPOINTMENT (OUTPATIENT)
Dept: LAB | Facility: HOSPITAL | Age: 71
End: 2021-07-07
Attending: INTERNAL MEDICINE
Payer: COMMERCIAL

## 2021-07-07 ENCOUNTER — HOSPITAL ENCOUNTER (OUTPATIENT)
Dept: CT IMAGING | Facility: HOSPITAL | Age: 71
End: 2021-07-07
Attending: INTERNAL MEDICINE
Payer: COMMERCIAL

## 2021-07-07 ENCOUNTER — HOSPITAL ENCOUNTER (OUTPATIENT)
Dept: VASCULAR ULTRASOUND | Facility: HOSPITAL | Age: 71
Discharge: HOME | End: 2021-07-07
Payer: COMMERCIAL

## 2021-07-07 ENCOUNTER — HOSPITAL ENCOUNTER (OUTPATIENT)
Dept: CT IMAGING | Facility: HOSPITAL | Age: 71
Discharge: HOME | End: 2021-07-07
Attending: INTERNAL MEDICINE
Payer: COMMERCIAL

## 2021-07-07 ENCOUNTER — APPOINTMENT (OUTPATIENT)
Dept: RADIOLOGY | Facility: CLINIC | Age: 71
End: 2021-07-07
Payer: COMMERCIAL

## 2021-07-07 DIAGNOSIS — I82.442 EMBOLISM AND THROMBOSIS OF LEFT TIBIAL VEIN (HCC): ICD-10-CM

## 2021-07-07 DIAGNOSIS — J20.9 ACUTE BRONCHITIS, UNSPECIFIED ORGANISM: ICD-10-CM

## 2021-07-07 DIAGNOSIS — C50.911 MALIGNANT NEOPLASM OF RIGHT FEMALE BREAST, UNSPECIFIED ESTROGEN RECEPTOR STATUS, UNSPECIFIED SITE OF BREAST (HCC): ICD-10-CM

## 2021-07-07 DIAGNOSIS — K76.0 FATTY METAMORPHOSIS OF LIVER: ICD-10-CM

## 2021-07-07 DIAGNOSIS — R16.0 HEPATOMEGALY: ICD-10-CM

## 2021-07-07 DIAGNOSIS — R94.5 NONSPECIFIC ABNORMAL RESULTS OF LIVER FUNCTION STUDY: ICD-10-CM

## 2021-07-07 DIAGNOSIS — E78.2 MIXED HYPERLIPIDEMIA: ICD-10-CM

## 2021-07-07 DIAGNOSIS — J21.9 NECROTIZING BRONCHIOLITIS: ICD-10-CM

## 2021-07-07 DIAGNOSIS — C50.611 MALIGNANT NEOPLASM OF AXILLARY TAIL OF RIGHT FEMALE BREAST, UNSPECIFIED ESTROGEN RECEPTOR STATUS (HCC): ICD-10-CM

## 2021-07-07 DIAGNOSIS — I48.0 PAROXYSMAL ATRIAL FIBRILLATION (HCC): ICD-10-CM

## 2021-07-07 DIAGNOSIS — I10 ESSENTIAL HYPERTENSION, MALIGNANT: ICD-10-CM

## 2021-07-07 LAB
ALBUMIN SERPL BCP-MCNC: 3.9 G/DL (ref 3.4–4.8)
ALP SERPL-CCNC: 78.7 U/L (ref 35–140)
ALT SERPL W P-5'-P-CCNC: 34 U/L (ref 5–54)
ANION GAP SERPL CALCULATED.3IONS-SCNC: 11 MMOL/L (ref 4–13)
APTT PPP: 33 SECONDS (ref 23–31)
AST SERPL W P-5'-P-CCNC: 48 U/L (ref 15–41)
BASOPHILS # BLD AUTO: 0.05 THOUSANDS/ΜL (ref 0–0.1)
BASOPHILS NFR BLD AUTO: 0 % (ref 0–1)
BILIRUB SERPL-MCNC: 0.79 MG/DL (ref 0.3–1.2)
BUN SERPL-MCNC: 11 MG/DL (ref 6–20)
CALCIUM SERPL-MCNC: 9.2 MG/DL (ref 8.4–10.2)
CHLORIDE SERPL-SCNC: 102 MMOL/L (ref 96–108)
CHOLEST SERPL-MCNC: 108 MG/DL
CO2 SERPL-SCNC: 26 MMOL/L (ref 22–33)
CREAT SERPL-MCNC: 0.83 MG/DL (ref 0.4–1.1)
CRP SERPL QL: 1.3 MG/L (ref 0–1)
D DIMER PPP FEU-MCNC: 0.64 MG/L FEU (ref 0.19–0.49)
EOSINOPHIL # BLD AUTO: 0.11 THOUSAND/ΜL (ref 0–0.61)
EOSINOPHIL NFR BLD AUTO: 1 % (ref 0–6)
ERYTHROCYTE [DISTWIDTH] IN BLOOD BY AUTOMATED COUNT: 15.3 % (ref 11.6–15.1)
GFR SERPL CREATININE-BSD FRML MDRD: 73 ML/MIN/1.73SQ M
GLUCOSE SERPL-MCNC: 113 MG/DL (ref 65–140)
HCT VFR BLD AUTO: 39.9 % (ref 34.8–46.1)
HDLC SERPL-MCNC: 33 MG/DL
HGB BLD-MCNC: 13.3 G/DL (ref 11.5–15.4)
IMM GRANULOCYTES # BLD AUTO: 0.03 THOUSAND/UL (ref 0–0.2)
IMM GRANULOCYTES NFR BLD AUTO: 0 % (ref 0–2)
INR PPP: 1.01 (ref 0.9–1.1)
LDLC SERPL CALC-MCNC: 57 MG/DL (ref 0–100)
LYMPHOCYTES # BLD AUTO: 2.86 THOUSANDS/ΜL (ref 0.6–4.47)
LYMPHOCYTES NFR BLD AUTO: 24 % (ref 14–44)
MCH RBC QN AUTO: 29.2 PG (ref 26.8–34.3)
MCHC RBC AUTO-ENTMCNC: 33.3 G/DL (ref 31.4–37.4)
MCV RBC AUTO: 88 FL (ref 82–98)
MONOCYTES # BLD AUTO: 0.65 THOUSAND/ΜL (ref 0.17–1.22)
MONOCYTES NFR BLD AUTO: 6 % (ref 4–12)
NEUTROPHILS # BLD AUTO: 8.16 THOUSANDS/ΜL (ref 1.85–7.62)
NEUTS SEG NFR BLD AUTO: 69 % (ref 43–75)
NONHDLC SERPL-MCNC: 75 MG/DL
PLATELET # BLD AUTO: 150 THOUSANDS/UL (ref 149–390)
PMV BLD AUTO: 12.2 FL (ref 8.9–12.7)
POTASSIUM SERPL-SCNC: 4 MMOL/L (ref 3.5–5)
PROT SERPL-MCNC: 7.1 G/DL (ref 6.4–8.3)
PROTHROMBIN TIME: 11.4 SECONDS (ref 9.5–12.1)
RBC # BLD AUTO: 4.55 MILLION/UL (ref 3.81–5.12)
SODIUM SERPL-SCNC: 139 MMOL/L (ref 133–145)
TRIGL SERPL-MCNC: 92.4 MG/DL
TROPONIN I SERPL-MCNC: <0.03 NG/ML (ref 0–0.07)
TSH SERPL DL<=0.05 MIU/L-ACNC: 5.83 UIU/ML (ref 0.34–5.6)
WBC # BLD AUTO: 11.86 THOUSAND/UL (ref 4.31–10.16)

## 2021-07-07 PROCEDURE — 80053 COMPREHEN METABOLIC PANEL: CPT

## 2021-07-07 PROCEDURE — 84439 ASSAY OF FREE THYROXINE: CPT

## 2021-07-07 PROCEDURE — 85610 PROTHROMBIN TIME: CPT

## 2021-07-07 PROCEDURE — 85025 COMPLETE CBC W/AUTO DIFF WBC: CPT

## 2021-07-07 PROCEDURE — 84443 ASSAY THYROID STIM HORMONE: CPT

## 2021-07-07 PROCEDURE — 85379 FIBRIN DEGRADATION QUANT: CPT

## 2021-07-07 PROCEDURE — 82977 ASSAY OF GGT: CPT

## 2021-07-07 PROCEDURE — 36415 COLL VENOUS BLD VENIPUNCTURE: CPT

## 2021-07-07 PROCEDURE — 86140 C-REACTIVE PROTEIN: CPT

## 2021-07-07 PROCEDURE — 93971 EXTREMITY STUDY: CPT

## 2021-07-07 PROCEDURE — 80061 LIPID PANEL: CPT

## 2021-07-07 PROCEDURE — 71046 X-RAY EXAM CHEST 2 VIEWS: CPT

## 2021-07-07 PROCEDURE — 84484 ASSAY OF TROPONIN QUANT: CPT

## 2021-07-07 PROCEDURE — 85730 THROMBOPLASTIN TIME PARTIAL: CPT

## 2021-07-07 PROCEDURE — 93971 EXTREMITY STUDY: CPT | Performed by: SURGERY

## 2021-07-07 PROCEDURE — 83036 HEMOGLOBIN GLYCOSYLATED A1C: CPT

## 2021-07-08 ENCOUNTER — HOSPITAL ENCOUNTER (OUTPATIENT)
Dept: ULTRASOUND IMAGING | Facility: HOSPITAL | Age: 71
Discharge: HOME | End: 2021-07-08
Attending: INTERNAL MEDICINE
Payer: COMMERCIAL

## 2021-07-08 DIAGNOSIS — R16.0 HEPATOMEGALY, NOT ELSEWHERE CLASSIFIED: ICD-10-CM

## 2021-07-08 DIAGNOSIS — C50.919 BREAST CANCER IN FEMALE (HCC): ICD-10-CM

## 2021-07-08 LAB
EST. AVERAGE GLUCOSE BLD GHB EST-MCNC: 160 MG/DL
GGT SERPL-CCNC: 83 U/L (ref 5–85)
HBA1C MFR BLD: 7.2 %
T4 FREE SERPL-MCNC: 1.34 NG/DL (ref 0.76–1.46)

## 2021-07-08 PROCEDURE — 76700 US EXAM ABDOM COMPLETE: CPT

## 2021-08-18 ENCOUNTER — EVALUATION (OUTPATIENT)
Dept: PHYSICAL THERAPY | Facility: CLINIC | Age: 71
End: 2021-08-18
Payer: COMMERCIAL

## 2021-08-18 DIAGNOSIS — M67.971 DISORDER OF RIGHT ACHILLES TENDON: Primary | ICD-10-CM

## 2021-08-18 PROCEDURE — 97162 PT EVAL MOD COMPLEX 30 MIN: CPT | Performed by: PHYSICAL THERAPIST

## 2021-08-18 NOTE — LETTER
2021    Lopez Roy MD  [address] [facility] [address]    Patient: Sharmin Johnson   YOB: 1950   Date of Visit: 2021     Encounter Diagnosis     ICD-10-CM    1  Disorder of right Achilles tendon  M67 971        Dear Dr Neda Hernandez: Thank you for your recent referral of Sharmin Johnson  Please review the attached evaluation summary from Radha's recent visit  Please verify that you agree with the plan of care by signing the attached order  If you have any questions or concerns, please do not hesitate to call  I sincerely appreciate the opportunity to share in the care of one of your patients and hope to have another opportunity to work with you in the near future  Sincerely,    Leo Nichols, PT      Referring Provider:      I certify that I have read the below Plan of Care and certify the need for these services furnished under this plan of treatment while under my care  Lopez Roy MD  [address] [facility] [address]  Via Fax: [phone]          PT Evaluation     Today's date: 2021  Patient name: Sharmin Johnson  : 1950  MRN: [id number]  Referring provider: Garth Fields MD  Dx:   Encounter Diagnosis     ICD-10-CM    1  Disorder of right Achilles tendon  M67 971        Start Time: 1500  Stop Time: 1530  Total time in clinic (min): 30 minutes    Assessment  Assessment details: Sharmin Johnson is a pleasant 78 y o  female who presents with chronic R achilles tendinopathy  The patient's greatest concerns are worry over not knowing what's wrong, concern at no signs of improvement, wanting to avoid surgery and fear of not being able to keep active  No further referral appears necessary at this time based upon examination results      Primary movement impairment diagnosis of R ankle DF ROM resulting in pathoanatomical symptoms of Disorder of right Achilles tendon  (primary encounter diagnosis) and limiting her ability to exercise or recreation, go to work, squat to  objects from the floor, stand and walk  Impairments include:  1) R ankle DF ROM  2) R achilles strength  3) R hip strength    Etiologic factors include poor lower extremity strength and poor balance  Discussed risks, benefits, and alternatives to treatment, and answered all patient questions to patient satisfaction  Impairments: abnormal gait, abnormal muscle firing, abnormal muscle tone, abnormal or restricted ROM, abnormal movement, activity intolerance, impaired balance, impaired physical strength, lacks appropriate home exercise program, pain with function and poor posture   Understanding of Dx/Px/POC: good   Prognosis: good  Prognosis details: Positive prognostic indicators include positive attitude toward recovery, good understanding of diagnosis and treatment plan options and absence of observed red flags  Negative prognostic indicators include chronicity of symptoms, high symptom irritability, multiple concurrent orthopedic problems, multiple prior failed treatments and obesity  Goals  Impairment Goals 4-6 weeks  - Decrease pain to <3/10  - Improve ankle AROM to equal to the unaffected lower extremity  - Improve ankle strength to 4/5  - Increase hip strength to 4/5 throughout    Functional Goals 6-8 weeks  - Return to Prior Level of Function  - Increase Functional Status Measure (FOTO) to: 62  - Patient will be independent with HEP  - Patient will be able to walk without increased pain/compensation/difficulty  - Patient will be able to perform sit to stand without increased pain/compensation/difficulty   - Patient will be able to ascend and descend stairs without increased pain/compensation/difficulty       Plan  Plan details: Prognosis above is given PT services 2x/week tapering to 1x/week over the next 2 months and home program adherence    Patient would benefit from: skilled physical therapy  Planned modality interventions: cryotherapy, electrical stimulation/Russian stimulation, high voltage pulsed current: pain management, high voltage pulsed current: spasm management, H-Wave, TENS, thermotherapy: hydrocollator packs and unattended electrical stimulation  Planned therapy interventions: abdominal trunk stabilization, behavior modification, body mechanics training, breathing training, flexibility, functional ROM exercises, home exercise program, joint mobilization, manual therapy, massage, Waller taping, muscle pump exercises, neuromuscular re-education, patient education, postural training, strengthening, stretching, therapeutic activities, therapeutic exercise, therapeutic training, balance, gait training and transfer training  Frequency: 2x week  Duration in weeks: 8  Treatment plan discussed with: patient        Subjective Evaluation    History of Present Illness  Mechanism of injury: WORK/SCHOOL: [employer]  HOME LIFE: full flight upstairs, only goes up to shower  HOBBIES/EXERCISE: nothing at the moment, has a recumbent bike  PLOF:  L achilles pain 6 years ago, fixed with PT  HISTORY OF CURRENT INJURY:  Patient has achilles pain, which started around March  She tried ice and heat, and a boot  She went to MD who recommended the boot  She has pain in the ankle and the bottom of the heel  Md recommended either PT or surgery due to large heel spurs on both heels  She exacerbated her pain getting a pedicure and she got a massage of her foot and has felt worse since then  PAIN LOCATION/DESCRIPTORS: Pain is present but not debilitating located in the back of the R heel and in medial ankle at times  Pain is burning and achy     AGGRAVATING FACTORS:  Walking, going up on her toes, prolonged standing, going up and down the stairs  EASES: ice, compression/cushion sleeve from shoe  DAY PATTERN: Worst in the afternoon with activity  IMAGING:  X-rays of susan ankles show large heel spurs on susan calceaneus at achilles insertion  SPECIAL QUESTIONS:    [place] Deleon denies a new onset of Bladder incontinence, Bowel dysfunction, Recent unexplained weight loss, Clumsy or unsteadiness and Constant night pain  Hx of breast cancer in 2016, mastectomy  PATIENT GOALS: Patient wishes to avoid surgery  Patient wishes to be able to be more active  Pain  Current pain rating: 3  At best pain ratin  At worst pain ratin  Quality: dull ache, discomfort and tight  Progression: improved    Patient Goals  Patient goals for therapy: decreased pain, improved balance, increased motion, increased strength, independence with ADLs/IADLs and return to sport/leisure activities          Objective     Active Range of Motion   Left Ankle/Foot   Dorsiflexion (ke): 5 degrees   Plantar flexion: 60 degrees   Inversion: 40 degrees   Eversion: 20 degrees     Right Ankle/Foot   Dorsiflexion (ke): 0 degrees   Plantar flexion: 60 degrees with pain  Inversion: 40 degrees   Eversion: 20 degrees     Strength/Myotome Testing     Left Ankle/Foot   Normal strength    Right Ankle/Foot   Dorsiflexion: 4+  Plantar flexion: 3-  Inversion: 4+  Eversion: 4+    Additional Strength Details  SL heel raise: 20 on L, unable on R due to pain and weakness    Hip strength 4/5 except R hip abd and ER 3+/5    Tests     Right Ankle/Foot   Positive for calcaneal squeeze and navicular drop  Additional Tests Details  Pain with palpation of R calcaneus and achilles insertion and medial ankle    General Comments:       Ankle/Foot Comments   Sit to stand: heavy use of hands  Squat: varus, pulling on R achilles  Posture: varus knees, susan toe out, susan hubert's deformities  SLS: 9 sec on L, 2 sec on R             Diagnosis: R achilles tendinopathy   Precautions: chronicity   Primary Goals: 1) R ankle DF ROM  2) R achilles strength  3) R hip strength   *asterisks by exercise = given for HEP   Manuals        R achilles and heel IASTM        DF mobs                                There Ex        Bike        Wall calf S        Wall soleus S        Plantar fascia S        DF MWM                                        Neuro Re-Ed        Towel scrunches        Short foot        Heel raise eccentrics seated        Heel raise eccentrics leg press        Clamshells         SLR        Hip abd SLR        Standing hip abd and ext SLR        Bridges        X walks                         Re-evaluation              Ther Act                                         Modalities             Ice  NV

## 2021-08-18 NOTE — PROGRESS NOTES
PT Evaluation     Today's date: 2021  Patient name: Sharmin Johnson  : 1950  MRN: [id number]  Referring provider: Garth Fields MD  Dx:   Encounter Diagnosis     ICD-10-CM    1  Disorder of right Achilles tendon  M67 971        Start Time: 1500  Stop Time: 1530  Total time in clinic (min): 30 minutes    Assessment  Assessment details: Sharmin Johnson is a pleasant 78 y o  female who presents with chronic R achilles tendinopathy  The patient's greatest concerns are worry over not knowing what's wrong, concern at no signs of improvement, wanting to avoid surgery and fear of not being able to keep active  No further referral appears necessary at this time based upon examination results  Primary movement impairment diagnosis of R ankle DF ROM resulting in pathoanatomical symptoms of Disorder of right Achilles tendon  (primary encounter diagnosis) and limiting her ability to exercise or recreation, go to work, squat to  objects from the floor, stand and walk  Impairments include:  1) R ankle DF ROM  2) R achilles strength  3) R hip strength    Etiologic factors include poor lower extremity strength and poor balance  Discussed risks, benefits, and alternatives to treatment, and answered all patient questions to patient satisfaction  Impairments: abnormal gait, abnormal muscle firing, abnormal muscle tone, abnormal or restricted ROM, abnormal movement, activity intolerance, impaired balance, impaired physical strength, lacks appropriate home exercise program, pain with function and poor posture   Understanding of Dx/Px/POC: good   Prognosis: good  Prognosis details: Positive prognostic indicators include positive attitude toward recovery, good understanding of diagnosis and treatment plan options and absence of observed red flags    Negative prognostic indicators include chronicity of symptoms, high symptom irritability, multiple concurrent orthopedic problems, multiple prior failed treatments and obesity  Goals  Impairment Goals 4-6 weeks  - Decrease pain to <3/10  - Improve ankle AROM to equal to the unaffected lower extremity  - Improve ankle strength to 4/5  - Increase hip strength to 4/5 throughout    Functional Goals 6-8 weeks  - Return to Prior Level of Function  - Increase Functional Status Measure (FOTO) to: 62  - Patient will be independent with HEP  - Patient will be able to walk without increased pain/compensation/difficulty  - Patient will be able to perform sit to stand without increased pain/compensation/difficulty   - Patient will be able to ascend and descend stairs without increased pain/compensation/difficulty       Plan  Plan details: Prognosis above is given PT services 2x/week tapering to 1x/week over the next 2 months and home program adherence    Patient would benefit from: skilled physical therapy  Planned modality interventions: cryotherapy, electrical stimulation/Russian stimulation, high voltage pulsed current: pain management, high voltage pulsed current: spasm management, H-Wave, TENS, thermotherapy: hydrocollator packs and unattended electrical stimulation  Planned therapy interventions: abdominal trunk stabilization, behavior modification, body mechanics training, breathing training, flexibility, functional ROM exercises, home exercise program, joint mobilization, manual therapy, massage, Waller taping, muscle pump exercises, neuromuscular re-education, patient education, postural training, strengthening, stretching, therapeutic activities, therapeutic exercise, therapeutic training, balance, gait training and transfer training  Frequency: 2x week  Duration in weeks: 8  Treatment plan discussed with: patient        Subjective Evaluation    History of Present Illness  Mechanism of injury: WORK/SCHOOL: [employer]  HOME LIFE: full flight upstairs, only goes up to shower  HOBBIES/EXERCISE: nothing at the moment, has a recumbent bike  PLOF:  L achilles pain 6 years ago, fixed with PT  HISTORY OF CURRENT INJURY:  Patient has achilles pain, which started around March  She tried ice and heat, and a boot  She went to MD who recommended the boot  She has pain in the ankle and the bottom of the heel  Md recommended either PT or surgery due to large heel spurs on both heels  She exacerbated her pain getting a pedicure and she got a massage of her foot and has felt worse since then  PAIN LOCATION/DESCRIPTORS: Pain is present but not debilitating located in the back of the R heel and in medial ankle at times  Pain is burning and achy  AGGRAVATING FACTORS:  Walking, going up on her toes, prolonged standing, going up and down the stairs  EASES: ice, compression/cushion sleeve from shoe  DAY PATTERN: Worst in the afternoon with activity  IMAGING:  X-rays of susan ankles show large heel spurs on susan calceaneus at achilles insertion  SPECIAL QUESTIONS:    [place] Estelle denies a new onset of Bladder incontinence, Bowel dysfunction, Recent unexplained weight loss, Clumsy or unsteadiness and Constant night pain  Hx of breast cancer in 2016, mastectomy  PATIENT GOALS: Patient wishes to avoid surgery  Patient wishes to be able to be more active        Pain  Current pain rating: 3  At best pain ratin  At worst pain ratin  Quality: dull ache, discomfort and tight  Progression: improved    Patient Goals  Patient goals for therapy: decreased pain, improved balance, increased motion, increased strength, independence with ADLs/IADLs and return to sport/leisure activities          Objective     Active Range of Motion   Left Ankle/Foot   Dorsiflexion (ke): 5 degrees   Plantar flexion: 60 degrees   Inversion: 40 degrees   Eversion: 20 degrees     Right Ankle/Foot   Dorsiflexion (ke): 0 degrees   Plantar flexion: 60 degrees with pain  Inversion: 40 degrees   Eversion: 20 degrees     Strength/Myotome Testing     Left Ankle/Foot   Normal strength    Right Ankle/Foot Dorsiflexion: 4+  Plantar flexion: 3-  Inversion: 4+  Eversion: 4+    Additional Strength Details  SL heel raise: 20 on L, unable on R due to pain and weakness    Hip strength 4/5 except R hip abd and ER 3+/5    Tests     Right Ankle/Foot   Positive for calcaneal squeeze and navicular drop  Additional Tests Details  Pain with palpation of R calcaneus and achilles insertion and medial ankle    General Comments:       Ankle/Foot Comments   Sit to stand: heavy use of hands  Squat: varus, pulling on R achilles  Posture: varus knees, susan toe out, susan hubert's deformities  SLS: 9 sec on L, 2 sec on R             Diagnosis: R achilles tendinopathy   Precautions: chronicity   Primary Goals: 1) R ankle DF ROM  2) R achilles strength  3) R hip strength   *asterisks by exercise = given for HEP   Manuals 8/18       R achilles and heel IASTM        DF mobs                                There Ex        Bike        Wall calf S        Wall soleus S        Plantar fascia S        DF MWM                                        Neuro Re-Ed        Towel scrunches        Short foot        Heel raise eccentrics seated        Heel raise eccentrics leg press        Clamshells         SLR        Hip abd SLR        Standing hip abd and ext SLR        Bridges        X walks                         Re-evaluation              Ther Act                                         Modalities             Ice  NV

## 2021-08-19 ENCOUNTER — OFFICE VISIT (OUTPATIENT)
Dept: PHYSICAL THERAPY | Facility: CLINIC | Age: 71
End: 2021-08-19
Payer: COMMERCIAL

## 2021-08-19 DIAGNOSIS — M67.971 DISORDER OF RIGHT ACHILLES TENDON: Primary | ICD-10-CM

## 2021-08-19 PROCEDURE — 97112 NEUROMUSCULAR REEDUCATION: CPT | Performed by: PHYSICAL THERAPIST

## 2021-08-19 PROCEDURE — 97110 THERAPEUTIC EXERCISES: CPT | Performed by: PHYSICAL THERAPIST

## 2021-08-19 NOTE — PROGRESS NOTES
Daily Note     Today's date: 2021  Patient name: Brinda Winchester  : 1950  MRN: [id number]  Referring provider: Lori Clayton MD  Dx:   Encounter Diagnosis     ICD-10-CM    1  Disorder of right Achilles tendon  M67 971        Start Time: 1700  Stop Time: 1750  Total time in clinic (min): 50 minutes    Subjective: Patient reports her heel is rubbing because of the shoes she was wearing  Objective: See treatment diary below    Assessment: Patient tolerated session well, focused on gentle stretching and HEP for muscle activation and intrinsic strengthening  She was able to perform seated heel raise eccentrics without pain in heel  HEP printed and reviewed  Session ended with ice  Plan: Progress treatment as tolerated         Diagnosis: R achilles tendinopathy   Precautions: chronicity   Primary Goals: 1) R ankle DF ROM  2) R achilles strength  3) R hip strength   *asterisks by exercise = given for HEP   Manuals       R achilles and heel IASTM  NV      DF mobs  NV                              There Ex        Bike  5 min      Wall calf S*  3x30 sec       Wall soleus S        Plantar fascia S*  3x30 sec susan with towel      DF MWM                                        Neuro Re-Ed        Towel scrunches*  2 min susan      Short foot*  2 min      Heel raise eccentrics seated*  2x10 seated R only      Heel raise eccentrics leg press        Clamshells         SLR        Hip abd SLR        Standing hip abd and ext SLR        Bridges        X walks                         Re-evaluation              Ther Act                                         Modalities             Ice  NV 10 min R ankle/heel

## 2021-08-25 ENCOUNTER — OFFICE VISIT (OUTPATIENT)
Dept: PHYSICAL THERAPY | Facility: CLINIC | Age: 71
End: 2021-08-25
Payer: COMMERCIAL

## 2021-08-25 DIAGNOSIS — M67.971 DISORDER OF RIGHT ACHILLES TENDON: Primary | ICD-10-CM

## 2021-08-25 PROCEDURE — 97110 THERAPEUTIC EXERCISES: CPT

## 2021-08-25 PROCEDURE — 97140 MANUAL THERAPY 1/> REGIONS: CPT

## 2021-08-25 PROCEDURE — 97112 NEUROMUSCULAR REEDUCATION: CPT

## 2021-08-25 NOTE — PROGRESS NOTES
Daily Note     Today's date: 2021  Patient name: Turner Pal  : 1950  MRN: [id number]  Referring provider: Jose Enrique MD  Dx:   Encounter Diagnosis     ICD-10-CM    1  Disorder of right Achilles tendon  M67 971        Start Time: 1700  Stop Time: 174  Total time in clinic (min): 44 minutes    Subjective: Patient states she was not compliant with HEP because she was sore following wearing the wrong shoes to work  She states she is feeling a little better today  Objective: See treatment diary below      Assessment: Continued with outlined program  Patient has good tolerance to IASTM to achilles and heel with minimal tenderness  She was able to perform hip strengthening exercises this visit without pain symptoms, but noted muscle fatigue  Patient measured 5* DF pre and post manuals (pt started at 0* at eval)  Patient has good tolerance overall to exercises, however increase pain with calf S, will continue to trial  She states she feels much better post session  Will continue to progress as she is able to tolerate  Plan: Progress treatment as tolerated  Diagnosis: R achilles tendinopathy   Precautions: chronicity   Primary Goals: 1) R ankle DF ROM  2) R achilles strength  3) R hip strength   *asterisks by exercise = given for HEP   Manuals      R achilles and heel IASTM  NV KLS, PTA      DF mobs  NV IM, PT                              There Ex        Bike  5 min 5 min      Wall calf S*  3x30 sec  P!      Wall soleus S        Plantar fascia S*  3x30 sec susan with towel 3x30 sec susan with towel      DF MWM                                        Neuro Re-Ed        Towel scrunches*  2 min susan 2 min      Short foot*  2 min 2 min      Heel raise eccentrics seated*  2x10 seated R only 2x10 seated R only      Heel raise eccentrics leg press        Clamshells         SLR   Flex 2x10 ea      Hip abd SLR        Standing hip abd and ext SLR        Bridges   2x10      X walks Re-evaluation              Ther Act                                         Modalities             Ice  NV 10 min R ankle/heel

## 2021-08-26 ENCOUNTER — OFFICE VISIT (OUTPATIENT)
Dept: PHYSICAL THERAPY | Facility: CLINIC | Age: 71
End: 2021-08-26
Payer: COMMERCIAL

## 2021-08-26 DIAGNOSIS — M67.971 DISORDER OF RIGHT ACHILLES TENDON: Primary | ICD-10-CM

## 2021-08-26 PROCEDURE — 97110 THERAPEUTIC EXERCISES: CPT | Performed by: PHYSICAL THERAPIST

## 2021-08-26 PROCEDURE — 97140 MANUAL THERAPY 1/> REGIONS: CPT | Performed by: PHYSICAL THERAPIST

## 2021-08-26 PROCEDURE — 97112 NEUROMUSCULAR REEDUCATION: CPT | Performed by: PHYSICAL THERAPIST

## 2021-08-26 NOTE — PROGRESS NOTES
Daily Note     Today's date: 2021  Patient name: Turner Pal  : 1950  MRN: [id number]  Referring provider: Jose Enrique MD  Dx:   Encounter Diagnosis     ICD-10-CM    1  Disorder of right Achilles tendon  M67 971        Start Time: 76  Stop Time: 1630  Total time in clinic (min): 53 minutes    Subjective: Patient reports that she had a very long day and her heel is hurting today for some reason  She did not ice after PT yesterday  Objective: See treatment diary below    Assessment:  Patient tolerated current program well, with adjustments in form as needed to activate intrinsic foot musculature and coordinate eccentric strengthening  PT showed patient alternate calf S with towel with less discomfort compared to wall stretch  Plan: Progress treatment as tolerated  Diagnosis: R achilles tendinopathy   Precautions: chronicity   Primary Goals: 1) R ankle DF ROM  2) R achilles strength  3) R hip strength   *asterisks by exercise = given for HEP   Manuals     R achilles and heel IASTM  NV KLS, PTA  IM, PT    DF mobs  NV IM, PT                              There Ex        Bike  5 min 5 min  5 min upright bike    Wall calf S*  3x30 sec  P!      Wall soleus S        Plantar fascia S*  3x30 sec susan with towel 3x30 sec susan with towel      DF MWM        Towel calf S*    3x30 sec susan    BAPS board    Large x 15 ea direction                    Neuro Re-Ed        Towel scrunches*  2 min susan 2 min  2 min susan    Short foot*  2 min 2 min  2 min susan    Heel raise eccentrics seated*  2x10 seated R only 2x10 seated R only  3x10 seated susan    Heel raise eccentrics leg press        Clamshells         SLR   Flex 2x10 ea      Hip abd SLR        Standing hip abd and ext SLR        Bridges   2x10      X walks                         Re-evaluation              Ther Act                                         Modalities             Ice  NV 10 min R ankle/heel  10 min R ankle/heel

## 2021-09-01 ENCOUNTER — APPOINTMENT (OUTPATIENT)
Dept: PHYSICAL THERAPY | Facility: CLINIC | Age: 71
End: 2021-09-01
Payer: COMMERCIAL

## 2021-09-02 ENCOUNTER — OFFICE VISIT (OUTPATIENT)
Dept: PHYSICAL THERAPY | Facility: CLINIC | Age: 71
End: 2021-09-02
Payer: COMMERCIAL

## 2021-09-02 DIAGNOSIS — M67.971 DISORDER OF RIGHT ACHILLES TENDON: Primary | ICD-10-CM

## 2021-09-02 PROCEDURE — 97110 THERAPEUTIC EXERCISES: CPT | Performed by: PHYSICAL THERAPIST

## 2021-09-02 PROCEDURE — 97112 NEUROMUSCULAR REEDUCATION: CPT | Performed by: PHYSICAL THERAPIST

## 2021-09-02 NOTE — PROGRESS NOTES
Daily Note     Today's date: 2021  Patient name: Evan Hanks  : 1950  MRN: [id number]  Referring provider: Scotty Lerner MD  Dx:   Encounter Diagnosis     ICD-10-CM    1  Disorder of right Achilles tendon  M67 971        Start Time: 941  Stop Time:   Total time in clinic (min): 42 minutes    Subjective: Patient reports yesterday was a hard day but she feels better today  Objective: See treatment diary below    Assessment: Patient tolerated session well, with progression of hip strengthening and eccentric strengthening of R achilles  Patient continues to note improvement in symptoms following stretching  Moderate muscle fatigue throughout after visit  Plan: Progress treatment as tolerated  FOTO NV  Diagnosis: R achilles tendinopathy   Precautions: chronicity   Primary Goals: 1) R ankle DF ROM  2) R achilles strength  3) R hip strength   *asterisks by exercise = given for HEP   Manuals    R achilles and heel IASTM  NV KLS, PTA  IM, PT nv   DF mobs  NV IM, PT   nv                           There Ex        Bike  5 min 5 min  5 min upright bike 5 min bike   Wall calf S*  3x30 sec  P!      Wall soleus S        Plantar fascia S*  3x30 sec susan with towel 3x30 sec susan with towel      DF MWM        Towel calf S*    3x30 sec susan 3x30 sec susan   BAPS board    Large x 15 ea direction Large x 20 ea direction                    Neuro Re-Ed        Towel scrunches*  2 min susan 2 min  2 min susan 2 min susan   Short foot*  2 min 2 min  2 min susan 2 min susan   Heel raise eccentrics seated*  2x10 seated R only 2x10 seated R only  3x10 seated susan 5# 3x10 seated susan   Heel raise eccentrics leg press     30# 3x10 up with L down with R   Clamshells         SLR   Flex 2x10 ea      Hip abd SLR        Standing hip abd and ext SLR     2x10 ea susan   Bridges   2x10      X walks                         Re-evaluation              Ther Act                                         Modalities Ice  NV 10 min R ankle/heel  10 min R ankle/heel

## 2021-09-07 ENCOUNTER — OFFICE VISIT (OUTPATIENT)
Dept: PHYSICAL THERAPY | Facility: CLINIC | Age: 71
End: 2021-09-07
Payer: COMMERCIAL

## 2021-09-07 DIAGNOSIS — M67.971 DISORDER OF RIGHT ACHILLES TENDON: Primary | ICD-10-CM

## 2021-09-07 PROCEDURE — 97112 NEUROMUSCULAR REEDUCATION: CPT | Performed by: PHYSICAL THERAPIST

## 2021-09-07 PROCEDURE — 97110 THERAPEUTIC EXERCISES: CPT | Performed by: PHYSICAL THERAPIST

## 2021-09-07 NOTE — PROGRESS NOTES
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 and really paid for it the whole weekend  Objective: See treatment diary below    Assessment: FOTO reassessed today with improvement in score demonstrating progress towards functional goals  Session focused on stretching and strengthening of ankle and foot this date  Education provided on importance of supportive shoes  Plan: Continue per plan of care  Diagnosis: R achilles tendinopathy   Precautions: chronicity   Primary Goals: 1) R ankle DF ROM  2) R achilles strength  3) R hip strength   *asterisks by exercise = given for HEP   Manuals    R achilles and heel IASTM  NV KLS, PTA  IM, PT nv   DF mobs  NV IM, PT   nv                           There Ex        Bike 5 min bike 5 min 5 min  5 min upright bike 5 min bike   Wall calf S*  3x30 sec  P!      Wall soleus S        Plantar fascia S* 3x30 sec with towel 3x30 sec susan with towel 3x30 sec susan with towel      DF MWM        Towel calf S* 3x30 sec   3x30 sec susan 3x30 sec susan   BAPS board    Large x 15 ea direction Large x 20 ea direction    4 way ankle GTB x 30 all               Neuro Re-Ed        Towel scrunches*  2 min susan 2 min  2 min susan 2 min susan   Short foot*  2 min 2 min  2 min susan 2 min susan   Heel raise eccentrics seated* 5# 3x10 seated susan 2x10 seated R only 2x10 seated R only  3x10 seated susan 5# 3x10 seated susan   Heel raise eccentrics leg press 35# 3x15 up with 2 down with R    30# 3x10 up with L down with R   Clamshells         SLR   Flex 2x10 ea      Hip abd SLR        Standing hip abd and ext SLR     2x10 ea susan   Bridges   2x10 X walks        SLS Pain on R                Re-evaluation              Ther Act                                         Modalities             Ice   10 min R ankle/heel  10 min R ankle/heel

## 2021-09-09 ENCOUNTER — OFFICE VISIT (OUTPATIENT)
Dept: PHYSICAL THERAPY | Facility: CLINIC | Age: 71
End: 2021-09-09
Payer: COMMERCIAL

## 2021-09-09 DIAGNOSIS — M67.971 DISORDER OF RIGHT ACHILLES TENDON: Primary | ICD-10-CM

## 2021-09-09 PROCEDURE — 97110 THERAPEUTIC EXERCISES: CPT

## 2021-09-09 PROCEDURE — 97112 NEUROMUSCULAR REEDUCATION: CPT

## 2021-09-09 NOTE — PROGRESS NOTES
Daily Note     Today's date: 2021  Patient name: Marlyn Giordano  : 1950  MRN: [id number]  Referring provider: Armani Corral MD  Dx:   Encounter Diagnosis     ICD-10-CM    1  Disorder of right Achilles tendon  M67 971                   Subjective: Pt states she is a bit sore today  Pt states she is going to get new shoes this weekend  Objective: See treatment diary below    Assessment: Pt had decreased pain at the end of her PT session  Pt continues to be challenged with seated heel raises  Pt demonstrates improved plantar fascia restriction post self stretching  Pt would continue to benefit from PT  Plan: Continue per plan of care        Diagnosis: R achilles tendinopathy   Precautions: chronicity   Primary Goals: 1) R ankle DF ROM  2) R achilles strength  3) R hip strength   *asterisks by exercise = given for HEP   Manuals    R achilles and heel IASTM    IM, PT nv   DF mobs     nv                           There Ex        Bike 5 min bike 5 min R bike   5 min upright bike 5 min bike   Wall calf S*        Wall soleus S        Plantar fascia S* 3x30 sec with towel 3  X30"       DF MWM        Towel calf S* 3x30 sec 3  X30" ea   3x30 sec susan 3x30 sec susan   BAPS board    Large x 15 ea direction Large x 20 ea direction    4 way ankle GTB x 30 all GTB x 30 all               Neuro Re-Ed        Towel scrunches*  2 min susan   2 min susan 2 min susan   Short foot*  2 min   2 min susan 2 min susan   Heel raise eccentrics seated* 5# 3x10 seated susan 5# 3x10 seated susan  3x10 seated susan 5# 3x10 seated susan   Heel raise eccentrics leg press 35# 3x15 up with 2 down with R nv    30# 3x10 up with L down with R   Clamshells         SLR        Hip abd SLR        Standing hip abd and ext SLR     2x10 ea susan   Bridges        X walks        SLS Pain on R                Re-evaluation            Ther Act                                   Modalities           Ice     10 min R ankle/heel

## 2021-09-13 ENCOUNTER — OFFICE VISIT (OUTPATIENT)
Dept: PHYSICAL THERAPY | Facility: CLINIC | Age: 71
End: 2021-09-13
Payer: COMMERCIAL

## 2021-09-13 DIAGNOSIS — M67.971 DISORDER OF RIGHT ACHILLES TENDON: Primary | ICD-10-CM

## 2021-09-13 PROCEDURE — 97112 NEUROMUSCULAR REEDUCATION: CPT | Performed by: PHYSICAL THERAPIST

## 2021-09-13 PROCEDURE — 97110 THERAPEUTIC EXERCISES: CPT | Performed by: PHYSICAL THERAPIST

## 2021-09-13 PROCEDURE — 97140 MANUAL THERAPY 1/> REGIONS: CPT | Performed by: PHYSICAL THERAPIST

## 2021-09-13 NOTE — PROGRESS NOTES
Daily Note     Today's date: 2021  Patient name: Sharmin Johnson  : 1950  MRN: [id number]  Referring provider: Garth Fields MD  Dx:   Encounter Diagnosis     ICD-10-CM    1  Disorder of right Achilles tendon  M67 971        Start Time:   Stop Time: 05  Total time in clinic (min): 40 minutes    Subjective: Patient reports slight improvement in pain since wearing her new sneakers  Objective: See treatment diary below    Assessment: Hip strengthening progressed this date with moderate muscle fatigue noted by patient  Also increased reps with eccentric strengthening with muscle cramping but no pain in achilles  Trialed heel taping to determine if heel cups would assist in pain-relief within her shoes, with patient noting improvement  Plan: Progress treatment as tolerated         Diagnosis: R achilles tendinopathy   Precautions: chronicity   Primary Goals: 1) R ankle DF ROM  2) R achilles strength  3) R hip strength   *asterisks by exercise = given for HEP   Manuals    R achilles and heel IASTM    IM, PT nv   DF mobs     nv   Heel taping   IM, PT R heel                     There Ex        Bike 5 min bike 5 min bike  5 min bike 5 min upright bike 5 min bike   Wall calf S*        Wall soleus S        Plantar fascia S* 3x30 sec with towel 3  X30"       DF MWM        Towel calf S* 3x30 sec 3  X30" ea  3x30 sec ea 3x30 sec susan 3x30 sec susan   BAPS board   Large x 20 ea direction Large x 15 ea direction Large x 20 ea direction    4 way ankle GTB x 30 all GTB x 30 all               Neuro Re-Ed        Towel scrunches*  2 min susan   2 min susan 2 min susan   Short foot*  2 min   2 min susan 2 min susan   Heel raise eccentrics seated* 5# 3x10 seated susan 5# 3x10 seated susan 10# 3x10 seated susan 3x10 seated susan 5# 3x10 seated susan   Heel raise eccentrics leg press 35# 3x15 up with 2 down with R nv  35# 3x15 up with 2 down with R  30# 3x10 up with L down with R   Clamshells         SLR   2x10 susan Hip abd SLR        Standing hip abd and ext SLR   2x10 ea susan  2x10 ea susan   Bridges        X walks        SLS Pain on R                Re-evaluation            Ther Act                                   Modalities           Ice     10 min R ankle/heel

## 2021-09-15 ENCOUNTER — OFFICE VISIT (OUTPATIENT)
Dept: PHYSICAL THERAPY | Facility: CLINIC | Age: 71
End: 2021-09-15
Payer: COMMERCIAL

## 2021-09-15 ENCOUNTER — APPOINTMENT (OUTPATIENT)
Dept: LAB | Facility: CLINIC | Age: 71
End: 2021-09-15
Payer: COMMERCIAL

## 2021-09-15 DIAGNOSIS — Z79.810 CARE RELATED TO CURRENT TAMOXIFEN USE: ICD-10-CM

## 2021-09-15 DIAGNOSIS — Z90.11 STATUS POST RIGHT MASTECTOMY: ICD-10-CM

## 2021-09-15 DIAGNOSIS — M67.971 DISORDER OF RIGHT ACHILLES TENDON: Primary | ICD-10-CM

## 2021-09-15 LAB
ALBUMIN SERPL BCP-MCNC: 3.2 G/DL (ref 3.5–5)
ALP SERPL-CCNC: 81 U/L (ref 46–116)
ALT SERPL W P-5'-P-CCNC: 50 U/L (ref 12–78)
ANION GAP SERPL CALCULATED.3IONS-SCNC: 3 MMOL/L (ref 4–13)
AST SERPL W P-5'-P-CCNC: 49 U/L (ref 5–45)
BASOPHILS # BLD AUTO: 0.05 THOUSANDS/ΜL (ref 0–0.1)
BASOPHILS NFR BLD AUTO: 1 % (ref 0–1)
BILIRUB SERPL-MCNC: 0.45 MG/DL (ref 0.2–1)
BUN SERPL-MCNC: 8 MG/DL (ref 5–25)
CALCIUM ALBUM COR SERPL-MCNC: 9.1 MG/DL (ref 8.3–10.1)
CALCIUM SERPL-MCNC: 8.5 MG/DL (ref 8.3–10.1)
CANCER AG27-29 SERPL-ACNC: 15.8 U/ML (ref 0–42.3)
CHLORIDE SERPL-SCNC: 111 MMOL/L (ref 100–108)
CO2 SERPL-SCNC: 24 MMOL/L (ref 21–32)
CREAT SERPL-MCNC: 0.72 MG/DL (ref 0.6–1.3)
EOSINOPHIL # BLD AUTO: 0.13 THOUSAND/ΜL (ref 0–0.61)
EOSINOPHIL NFR BLD AUTO: 2 % (ref 0–6)
ERYTHROCYTE [DISTWIDTH] IN BLOOD BY AUTOMATED COUNT: 14.6 % (ref 11.6–15.1)
GFR SERPL CREATININE-BSD FRML MDRD: 86 ML/MIN/1.73SQ M
GGT SERPL-CCNC: 61 U/L (ref 5–85)
GLUCOSE P FAST SERPL-MCNC: 123 MG/DL (ref 65–99)
HCT VFR BLD AUTO: 41.3 % (ref 34.8–46.1)
HGB BLD-MCNC: 13.2 G/DL (ref 11.5–15.4)
IMM GRANULOCYTES # BLD AUTO: 0.03 THOUSAND/UL (ref 0–0.2)
IMM GRANULOCYTES NFR BLD AUTO: 0 % (ref 0–2)
LYMPHOCYTES # BLD AUTO: 2.12 THOUSANDS/ΜL (ref 0.6–4.47)
LYMPHOCYTES NFR BLD AUTO: 27 % (ref 14–44)
MCH RBC QN AUTO: 28.8 PG (ref 26.8–34.3)
MCHC RBC AUTO-ENTMCNC: 32 G/DL (ref 31.4–37.4)
MCV RBC AUTO: 90 FL (ref 82–98)
MONOCYTES # BLD AUTO: 0.41 THOUSAND/ΜL (ref 0.17–1.22)
MONOCYTES NFR BLD AUTO: 5 % (ref 4–12)
NEUTROPHILS # BLD AUTO: 5.01 THOUSANDS/ΜL (ref 1.85–7.62)
NEUTS SEG NFR BLD AUTO: 65 % (ref 43–75)
NRBC BLD AUTO-RTO: 0 /100 WBCS
PLATELET # BLD AUTO: 118 THOUSANDS/UL (ref 149–390)
PMV BLD AUTO: 12.7 FL (ref 8.9–12.7)
POTASSIUM SERPL-SCNC: 4 MMOL/L (ref 3.5–5.3)
PROT SERPL-MCNC: 7.2 G/DL (ref 6.4–8.2)
RBC # BLD AUTO: 4.59 MILLION/UL (ref 3.81–5.12)
SODIUM SERPL-SCNC: 138 MMOL/L (ref 136–145)
WBC # BLD AUTO: 7.75 THOUSAND/UL (ref 4.31–10.16)

## 2021-09-15 PROCEDURE — 82977 ASSAY OF GGT: CPT

## 2021-09-15 PROCEDURE — 97110 THERAPEUTIC EXERCISES: CPT

## 2021-09-15 PROCEDURE — 97112 NEUROMUSCULAR REEDUCATION: CPT

## 2021-09-15 PROCEDURE — 85025 COMPLETE CBC W/AUTO DIFF WBC: CPT

## 2021-09-15 PROCEDURE — 86300 IMMUNOASSAY TUMOR CA 15-3: CPT

## 2021-09-15 PROCEDURE — 36415 COLL VENOUS BLD VENIPUNCTURE: CPT

## 2021-09-15 PROCEDURE — 80053 COMPREHEN METABOLIC PANEL: CPT

## 2021-09-15 PROCEDURE — 97140 MANUAL THERAPY 1/> REGIONS: CPT

## 2021-09-15 NOTE — PROGRESS NOTES
Daily Note     Today's date: 9/15/2021  Patient name: Joby Del Rosario  : 1950  MRN: [id number]  Referring provider: Rosy Tim MD  Dx:   Encounter Diagnosis     ICD-10-CM    1  Disorder of right Achilles tendon  M67 971        Start Time:   Stop Time:   Total time in clinic (min): 47 minutes    Subjective: Patient states she was feeling great after the tape, but she noticed this afternoon she had increase pain and was having difficulty walking  She states she got new sneakers on  and has been wearing them non stop since  Objective: See treatment diary below      Assessment: Continued with outlined program  Educated patient on weaning into sneakers over the next few days with less wear time to begin with good understanding  She has less pain symptoms with WB following manuals performed this session  Trialed tandem stance with noticeable increase in pain with R foot posterior, but this improves with second set  Patient performed eccentric heel raises on LP without concentric with improvement  She states she feels much better following PT session  Plan: Progress treatment as tolerated         Diagnosis: R achilles tendinopathy   Precautions: chronicity   Primary Goals: 1) R ankle DF ROM  2) R achilles strength  3) R hip strength   *asterisks by exercise = given for HEP   Manuals 9/7 9/9 9/13 9/15 9/2   R achilles and heel IASTM    KLS, PTA  nv   DF mobs     nv   Heel taping   IM, PT R heel KLS, PTA                    There Ex        Bike 5 min bike 5 min bike  5 min bike 5 min bike  5 min bike   Wall calf S*    3x30 sec     Wall soleus S        Plantar fascia S* 3x30 sec with towel 3  X30"       DF MWM        Towel calf S* 3x30 sec 3  X30" ea  3x30 sec ea  3x30 sec susan   BAPS board   Large x 20 ea direction  Large x 20 ea direction    4 way ankle GTB x 30 all GTB x 30 all               Neuro Re-Ed        Towel scrunches*  2 min susan    2 min susan   Short foot*  2 min    2 min susan Heel raise eccentrics seated* 5# 3x10 seated susan 5# 3x10 seated susan 10# 3x10 seated susan 10# 3x10 seated susan  5# 3x10 seated susan   Heel raise eccentrics leg press 35# 3x15 up with 2 down with R nv  35# 3x15 up with 2 down with R 40# 2x10 up with L down with the R  30# 3x10 up with L down with R   Clamshells         SLR   2x10 susan     Hip abd SLR        Standing hip abd and ext SLR   2x10 ea susan 3x10 ea susan  2x10 ea susan   Bridges        X walks        SLS Pain on R       Tandem stance    2x30 sec              Re-evaluation           Ther Act                                Modalities          Ice

## 2021-09-20 ENCOUNTER — EVALUATION (OUTPATIENT)
Dept: PHYSICAL THERAPY | Facility: CLINIC | Age: 71
End: 2021-09-20
Payer: COMMERCIAL

## 2021-09-20 DIAGNOSIS — M67.971 DISORDER OF RIGHT ACHILLES TENDON: Primary | ICD-10-CM

## 2021-09-20 PROCEDURE — 97140 MANUAL THERAPY 1/> REGIONS: CPT | Performed by: PHYSICAL THERAPIST

## 2021-09-20 PROCEDURE — 97110 THERAPEUTIC EXERCISES: CPT | Performed by: PHYSICAL THERAPIST

## 2021-09-20 PROCEDURE — 97112 NEUROMUSCULAR REEDUCATION: CPT | Performed by: PHYSICAL THERAPIST

## 2021-09-20 NOTE — PROGRESS NOTES
PT Re-Evaluation     Today's date: 2021  Patient name: Turner Pal  : 1950  MRN: [id number]  Referring provider: Jose Enrique MD  Dx:   Encounter Diagnosis     ICD-10-CM    1  Disorder of right Achilles tendon  M67 971 PT plan of care cert/re-cert       Start Time: 1700  Stop Time: 1745  Total time in clinic (min): 45 minutes    Assessment  Assessment details: Turner Pal is a pleasant 78 y o  female who presents with chronic R achilles tendinopathy  She has made progress since IE in susan ankle ROM, in R ankle strength, and in overall function  She continues to have significant pain in R achilles insertion and inability to raise on R toes that contributes to difficulty with prolonged walking, stairs, and standing still  Overall improvements noted in gait and endurance during work since getting supportive shoes  Plan to continue skilled PT per POC to return to PLOF  Primary movement impairment diagnosis of R ankle DF ROM resulting in pathoanatomical symptoms of Disorder of right Achilles tendon  (primary encounter diagnosis) and limiting her ability to exercise or recreation, go to work, squat to  objects from the floor, stand and walk  Impairments include:  1) R ankle DF ROM  2) R achilles strength  3) R hip strength    Etiologic factors include poor lower extremity strength and poor balance  Discussed risks, benefits, and alternatives to treatment, and answered all patient questions to patient satisfaction    Impairments: abnormal gait, abnormal muscle firing, abnormal muscle tone, abnormal or restricted ROM, abnormal movement, activity intolerance, impaired balance, impaired physical strength, lacks appropriate home exercise program, pain with function and poor posture   Understanding of Dx/Px/POC: good   Prognosis: good  Prognosis details: Positive prognostic indicators include positive attitude toward recovery, good understanding of diagnosis and treatment plan options and absence of observed red flags  Negative prognostic indicators include chronicity of symptoms, high symptom irritability, multiple concurrent orthopedic problems, multiple prior failed treatments and obesity  Goals  Impairment Goals 4-6 weeks  - Decrease pain to <3/10 - partially met  - Improve ankle AROM to equal to the unaffected lower extremity - partially met  - Improve ankle strength to 4/5 - partially met  - Increase hip strength to 4/5 throughout - partially met    Functional Goals 6-8 weeks  - Return to Prior Level of Function - partially met  - Increase Functional Status Measure (FOTO) to: 62 - partially met  - Patient will be independent with HEP - partially met  - Patient will be able to walk without increased pain/compensation/difficulty - partially met  - Patient will be able to perform sit to stand without increased pain/compensation/difficulty - partially met  - Patient will be able to ascend and descend stairs without increased pain/compensation/difficulty - partially met      Plan  Plan details: Prognosis above is given PT services 2x/week tapering to 1x/week over the next month and home program adherence    Patient would benefit from: skilled physical therapy  Planned modality interventions: cryotherapy, electrical stimulation/Russian stimulation, high voltage pulsed current: pain management, high voltage pulsed current: spasm management, H-Wave, TENS, thermotherapy: hydrocollator packs and unattended electrical stimulation  Planned therapy interventions: abdominal trunk stabilization, behavior modification, body mechanics training, breathing training, flexibility, functional ROM exercises, home exercise program, joint mobilization, manual therapy, massage, Waller taping, muscle pump exercises, neuromuscular re-education, patient education, postural training, strengthening, stretching, therapeutic activities, therapeutic exercise, therapeutic training, balance, gait training and transfer training  Frequency: 2x week  Duration in weeks: 4  Treatment plan discussed with: patient        Subjective Evaluation    History of Present Illness  Mechanism of injury: WORK/SCHOOL: [employer]  HOME LIFE: full flight upstairs, only goes up to shower  HOBBIES/EXERCISE: nothing at the moment, has a recumbent bike  PLOF:  L achilles pain 6 years ago, fixed with PT  HISTORY OF CURRENT INJURY:  Patient has achilles pain, which started around March  She tried ice and heat, and a boot  She went to MD who recommended the boot  She has pain in the ankle and the bottom of the heel  Md recommended either PT or surgery due to large heel spurs on both heels  She exacerbated her pain getting a pedicure and she got a massage of her foot and has felt worse since then  Update: Patient has gotten new shoes, which help but she feels she may be wearing them too long (14 hours)  She reports an overall improvement in her R achilles pain since starting PT  Walking is greatly better  She can walk without a cane much more  PAIN LOCATION/DESCRIPTORS: Pain is present but not debilitating located in the back of the R heel and in medial ankle at times  Pain is burning and achy  AGGRAVATING FACTORS:  Walking long distances, going up on her toes, prolonged standing, going up and down the stairs  Improved: balance, walking, going up on her toes, going up and down the stairs  EASES: ice, compression/cushion sleeve from shoe  DAY PATTERN: Worst in the afternoon with activity  IMAGING:  X-rays of susan ankles show large heel spurs on susan calceaneus at achilles insertion  SPECIAL QUESTIONS:    Aide James denies a new onset of Bladder incontinence, Bowel dysfunction, Recent unexplained weight loss, Clumsy or unsteadiness and Constant night pain  Hx of breast cancer in 2016, mastectomy  PATIENT GOALS: Patient wishes to avoid surgery  Patient wishes to be able to be more active   - Patient rates herself at 40-50% improved  Pain  Current pain ratin  At best pain ratin  At worst pain ratin  Quality: dull ache, discomfort and tight  Progression: improved    Patient Goals  Patient goals for therapy: decreased pain, improved balance, increased motion, increased strength, independence with ADLs/IADLs and return to sport/leisure activities          Objective     Active Range of Motion   Left Ankle/Foot   Dorsiflexion (ke): 10 degrees   Plantar flexion: 60 degrees   Inversion: 40 degrees   Eversion: 20 degrees     Right Ankle/Foot   Dorsiflexion (ke): 8 degrees   Plantar flexion: 60 degrees with pain  Inversion: 40 degrees   Eversion: 20 degrees     Strength/Myotome Testing     Left Ankle/Foot   Normal strength    Right Ankle/Foot   Dorsiflexion: 4+  Plantar flexion: 3-  Inversion: 4+  Eversion: 4+    Additional Strength Details  SL heel raise: 20 on L, unable on R due to pain and weakness    Hip strength 4+/5 except R hip abd 3+/5    Tests     Right Ankle/Foot   Positive for calcaneal squeeze and navicular drop  Additional Tests Details  Pain with palpation of R calcaneus and achilles insertion and medial ankle    General Comments:       Ankle/Foot Comments   Sit to stand: heavy use of hands  Ambulation: antalgia favoring R ankle, varus knees maintained  Squat: varus, pulling on R achilles  Posture: varus knees, susan toe out, susan hubert's deformities  SLS: 22 sec on L, 3 sec on R              Diagnosis: R achilles tendinopathy   Precautions: chronicity   Primary Goals: 1) R ankle DF ROM  2) R achilles strength  3) R hip strength   *asterisks by exercise = given for HEP   Manuals 9/7 9/9 9/13 9/15 9/20   R achilles and heel IASTM    KLS, PTA  IM, PT   DF mobs        Heel taping   IM, PT R heel KLS, PTA IM, PT                   There Ex        Bike 5 min bike 5 min bike  5 min bike 5 min bike     Wall calf S*    3x30 sec  3x30 sec   Wall soleus S        Plantar fascia S* 3x30 sec with towel 3  X30"       DF MWM Towel calf S* 3x30 sec 3  X30" ea  3x30 sec ea     BAPS board   Large x 20 ea direction     4 way ankle GTB x 30 all GTB x 30 all               Neuro Re-Ed        Towel scrunches*  2 min susan       Short foot*  2 min       Heel raise eccentrics seated* 5# 3x10 seated susan 5# 3x10 seated susan 10# 3x10 seated susan 10# 3x10 seated susan     Heel raise eccentrics leg press 35# 3x15 up with 2 down with R nv  35# 3x15 up with 2 down with R 40# 2x10 up with L down with the R     Clamshells         SLR   2x10 susan     Hip abd SLR        Standing hip abd and ext SLR   2x10 ea susan 3x10 ea susan     Bridges        X walks        SLS Pain on R       Tandem stance    2x30 sec              Re-evaluation       IM, PT    Ther Act                                Modalities          Ice

## 2021-09-20 NOTE — LETTER
2022    Adria Bellamy MD  [address] [facility] [address]    Patient: Keerthi Moore   YOB: 1950   Date of Visit: 2021     Encounter Diagnosis     ICD-10-CM    1  Disorder of right Achilles tendon  M67 971 CANCELED: PT plan of care cert/re-cert       Dear Dr Flako Hill: Thank you for your recent referral of Keerthi Moore  Please review the attached evaluation summary from Radha's recent visit  Please verify that you agree with the plan of care by signing the attached order  If you have any questions or concerns, please do not hesitate to call  I sincerely appreciate the opportunity to share in the care of one of your patients and hope to have another opportunity to work with you in the near future  Sincerely,    Carlos Esquivel, PT      Referring Provider:      I certify that I have read the below Plan of Care and certify the need for these services furnished under this plan of treatment while under my care  Adria Bellamy MD  [address] [facility] [address]  Via Fax: [phone]          PT Re-Evaluation     Today's date: 2021  Patient name: Keerthi Moore  : 1950  MRN: [id number]  Referring provider: Mo Baron MD  Dx:   Encounter Diagnosis     ICD-10-CM    1  Disorder of right Achilles tendon  M67 971 PT plan of care cert/re-cert       Start Time: 1700  Stop Time: 1745  Total time in clinic (min): 45 minutes    Assessment  Assessment details: Keerthi Moore is a pleasant 78 y o  female who presents with chronic R achilles tendinopathy  She has made progress since IE in susan ankle ROM, in R ankle strength, and in overall function  She continues to have significant pain in R achilles insertion and inability to raise on R toes that contributes to difficulty with prolonged walking, stairs, and standing still  Overall improvements noted in gait and endurance during work since getting supportive shoes   Plan to continue skilled PT per POC to return to PLOF  Primary movement impairment diagnosis of R ankle DF ROM resulting in pathoanatomical symptoms of Disorder of right Achilles tendon  (primary encounter diagnosis) and limiting her ability to exercise or recreation, go to work, squat to  objects from the floor, stand and walk  Impairments include:  1) R ankle DF ROM  2) R achilles strength  3) R hip strength    Etiologic factors include poor lower extremity strength and poor balance  Discussed risks, benefits, and alternatives to treatment, and answered all patient questions to patient satisfaction  Impairments: abnormal gait, abnormal muscle firing, abnormal muscle tone, abnormal or restricted ROM, abnormal movement, activity intolerance, impaired balance, impaired physical strength, lacks appropriate home exercise program, pain with function and poor posture   Understanding of Dx/Px/POC: good   Prognosis: good  Prognosis details: Positive prognostic indicators include positive attitude toward recovery, good understanding of diagnosis and treatment plan options and absence of observed red flags  Negative prognostic indicators include chronicity of symptoms, high symptom irritability, multiple concurrent orthopedic problems, multiple prior failed treatments and obesity      Goals  Impairment Goals 4-6 weeks  - Decrease pain to <3/10 - partially met  - Improve ankle AROM to equal to the unaffected lower extremity - partially met  - Improve ankle strength to 4/5 - partially met  - Increase hip strength to 4/5 throughout - partially met    Functional Goals 6-8 weeks  - Return to Prior Level of Function - partially met  - Increase Functional Status Measure (FOTO) to: 62 - partially met  - Patient will be independent with HEP - partially met  - Patient will be able to walk without increased pain/compensation/difficulty - partially met  - Patient will be able to perform sit to stand without increased pain/compensation/difficulty - partially met  - Patient will be able to ascend and descend stairs without increased pain/compensation/difficulty - partially met      Plan  Plan details: Prognosis above is given PT services 2x/week tapering to 1x/week over the next month and home program adherence  Patient would benefit from: skilled physical therapy  Planned modality interventions: cryotherapy, electrical stimulation/Russian stimulation, high voltage pulsed current: pain management, high voltage pulsed current: spasm management, H-Wave, TENS, thermotherapy: hydrocollator packs and unattended electrical stimulation  Planned therapy interventions: abdominal trunk stabilization, behavior modification, body mechanics training, breathing training, flexibility, functional ROM exercises, home exercise program, joint mobilization, manual therapy, massage, Waller taping, muscle pump exercises, neuromuscular re-education, patient education, postural training, strengthening, stretching, therapeutic activities, therapeutic exercise, therapeutic training, balance, gait training and transfer training  Frequency: 2x week  Duration in weeks: 4  Treatment plan discussed with: patient        Subjective Evaluation    History of Present Illness  Mechanism of injury: WORK/SCHOOL: [employer]  HOME LIFE: full flight upstairs, only goes up to shower  HOBBIES/EXERCISE: nothing at the moment, has a recumbent bike  PLOF:  L achilles pain 6 years ago, fixed with PT  HISTORY OF CURRENT INJURY:  Patient has achilles pain, which started around March  She tried ice and heat, and a boot  She went to MD who recommended the boot  She has pain in the ankle and the bottom of the heel  Md recommended either PT or surgery due to large heel spurs on both heels  She exacerbated her pain getting a pedicure and she got a massage of her foot and has felt worse since then       Update: Patient has gotten new shoes, which help but she feels she may be wearing them too long (14 hours)  She reports an overall improvement in her R achilles pain since starting PT  Walking is greatly better  She can walk without a cane much more  PAIN LOCATION/DESCRIPTORS: Pain is present but not debilitating located in the back of the R heel and in medial ankle at times  Pain is burning and achy  AGGRAVATING FACTORS:  Walking long distances, going up on her toes, prolonged standing, going up and down the stairs  Improved: balance, walking, going up on her toes, going up and down the stairs  EASES: ice, compression/cushion sleeve from shoe  DAY PATTERN: Worst in the afternoon with activity  IMAGING:  X-rays of susan ankles show large heel spurs on susan calceaneus at achilles insertion  SPECIAL QUESTIONS:    Travon Rojas denies a new onset of Bladder incontinence, Bowel dysfunction, Recent unexplained weight loss, Clumsy or unsteadiness and Constant night pain  Hx of breast cancer in 2016, mastectomy  PATIENT GOALS: Patient wishes to avoid surgery  Patient wishes to be able to be more active   - Patient rates herself at 40-50% improved  Pain  Current pain ratin  At best pain ratin  At worst pain ratin  Quality: dull ache, discomfort and tight  Progression: improved    Patient Goals  Patient goals for therapy: decreased pain, improved balance, increased motion, increased strength, independence with ADLs/IADLs and return to sport/leisure activities          Objective     Active Range of Motion   Left Ankle/Foot   Dorsiflexion (ke): 10 degrees   Plantar flexion: 60 degrees   Inversion: 40 degrees   Eversion: 20 degrees     Right Ankle/Foot   Dorsiflexion (ke): 8 degrees   Plantar flexion: 60 degrees with pain  Inversion: 40 degrees   Eversion: 20 degrees     Strength/Myotome Testing     Left Ankle/Foot   Normal strength    Right Ankle/Foot   Dorsiflexion: 4+  Plantar flexion: 3-  Inversion: 4+  Eversion: 4+    Additional Strength Details  SL heel raise: 20 on L, unable on R due to pain and weakness    Hip strength 4+/5 except R hip abd 3+/5    Tests     Right Ankle/Foot   Positive for calcaneal squeeze and navicular drop  Additional Tests Details  Pain with palpation of R calcaneus and achilles insertion and medial ankle    General Comments:       Ankle/Foot Comments   Sit to stand: heavy use of hands  Ambulation: antalgia favoring R ankle, varus knees maintained  Squat: varus, pulling on R achilles  Posture: varus knees, susan toe out, susan hubert's deformities  SLS: 22 sec on L, 3 sec on R              Diagnosis: R achilles tendinopathy   Precautions: chronicity   Primary Goals: 1) R ankle DF ROM  2) R achilles strength  3) R hip strength   *asterisks by exercise = given for HEP   Manuals 9/7 9/9 9/13 9/15 9/20   R achilles and heel IASTM    KLS, PTA  IM, PT   DF mobs        Heel taping   IM, PT R heel KLS, PTA IM, PT                   There Ex        Bike 5 min bike 5 min bike  5 min bike 5 min bike     Wall calf S*    3x30 sec  3x30 sec   Wall soleus S        Plantar fascia S* 3x30 sec with towel 3  X30"       DF MWM        Towel calf S* 3x30 sec 3  X30" ea  3x30 sec ea     BAPS board   Large x 20 ea direction     4 way ankle GTB x 30 all GTB x 30 all               Neuro Re-Ed        Towel scrunches*  2 min susan       Short foot*  2 min       Heel raise eccentrics seated* 5# 3x10 seated susan 5# 3x10 seated susan 10# 3x10 seated susan 10# 3x10 seated susan     Heel raise eccentrics leg press 35# 3x15 up with 2 down with R nv  35# 3x15 up with 2 down with R 40# 2x10 up with L down with the R     Clamshells         SLR   2x10 susan     Hip abd SLR        Standing hip abd and ext SLR   2x10 ea susan 3x10 ea susan     Bridges        X walks        SLS Pain on R       Tandem stance    2x30 sec              Re-evaluation       IM, PT    Ther Act                                Modalities          Ice

## 2021-09-22 ENCOUNTER — OFFICE VISIT (OUTPATIENT)
Dept: PHYSICAL THERAPY | Facility: CLINIC | Age: 71
End: 2021-09-22
Payer: COMMERCIAL

## 2021-09-22 DIAGNOSIS — M67.971 DISORDER OF RIGHT ACHILLES TENDON: Primary | ICD-10-CM

## 2021-09-22 PROCEDURE — 97110 THERAPEUTIC EXERCISES: CPT

## 2021-09-22 PROCEDURE — 97140 MANUAL THERAPY 1/> REGIONS: CPT

## 2021-09-22 PROCEDURE — 97112 NEUROMUSCULAR REEDUCATION: CPT

## 2021-09-22 NOTE — PROGRESS NOTES
Daily Note     Today's date: 2021  Patient name: Robyn Mobley  : 1950  MRN: [id number]  Referring provider: Concetta Burnett MD  Dx:   Encounter Diagnosis     ICD-10-CM    1  Disorder of right Achilles tendon  M67 971        Start Time:   Stop Time: 1800  Total time in clinic (min): 36 minutes    Subjective: Patient states she has been feeling much better  She does still have pain with standing, but not as often  She states the shoes and tape have really made a difference  Objective: See treatment diary below    Assessment: Continued with outlined program  Patient has noticeable tenderness with IASTM to medial achilles insertion this visit  She was able to perform single limb standing with less difficulty this visit, however she does have muscle fatigue in her quad while maintaining  She was able to resume eccentric heel raises on LP at end of session and reports soreness  Will continue next session  Plan: Progress treatment as tolerated         Diagnosis: R achilles tendinopathy   Precautions: chronicity   Primary Goals: 1) R ankle DF ROM  2) R achilles strength  3) R hip strength   *asterisks by exercise = given for HEP   Manuals 9/22 9/9 9/13 9/15 9/20   R achilles and heel IASTM KLS, PTA   KLS, PTA  IM, PT   DF mobs        Heel taping KLS, PTA  IM, PT R heel KLS, PTA IM, PT                   There Ex        Bike 5 min 5 min bike  5 min bike 5 min bike     Wall calf S* 3x30 sec    3x30 sec  3x30 sec   Wall soleus S        Plantar fascia S*  3  X30"       DF MWM        Towel calf S*  3  X30" ea  3x30 sec ea     BAPS board   Large x 20 ea direction     4 way ankle  GTB x 30 all               Neuro Re-Ed        Towel scrunches*  2 min susan       Short foot*  2 min       Heel raise eccentrics seated* 10# 3x10 seated  5# 3x10 seated susan 10# 3x10 seated susan 10# 3x10 seated susan     Heel raise eccentrics leg press  nv  35# 3x15 up with 2 down with R 40# 2x10 up with L down with the R Clamshells         SLR   2x10 susan     Hip abd SLR        Standing hip abd and ext SLR 3x10 ea susan   2x10 ea susna 3x10 ea susan     Bridges        X walks        SLS        Tandem stance 2x30 sec    2x30 sec              Re-evaluation      IM, PT    Ther Act                             Modalities         Ice

## 2021-09-27 ENCOUNTER — OFFICE VISIT (OUTPATIENT)
Dept: PHYSICAL THERAPY | Facility: CLINIC | Age: 71
End: 2021-09-27
Payer: COMMERCIAL

## 2021-09-27 DIAGNOSIS — M67.971 DISORDER OF RIGHT ACHILLES TENDON: Primary | ICD-10-CM

## 2021-09-27 PROCEDURE — 97110 THERAPEUTIC EXERCISES: CPT

## 2021-09-27 PROCEDURE — 97140 MANUAL THERAPY 1/> REGIONS: CPT

## 2021-09-27 PROCEDURE — 97112 NEUROMUSCULAR REEDUCATION: CPT

## 2021-09-27 NOTE — PROGRESS NOTES
Daily Note     Today's date: 2021  Patient name: Cesia Serrano  : 1950  MRN: [id number]  Referring provider: Irma Kiran MD  Dx:   Encounter Diagnosis     ICD-10-CM    1  Disorder of right Achilles tendon  M67 971        Start Time:   Stop Time:   Total time in clinic (min): 42 minutes    Subjective: Patient states she got a compression sleeve with heel cup which she started wearing yesterday  She states it felt good in her flip flops but today she is having a lot of pain  Objective: See treatment diary below      Assessment: Continued with outlined program  Patient exhibits swelling around ankle compression sleeve and tightness with sneaker  Patient is going to trial just a heel cup while in her sneaker to avoid this  She has good tolerance to eccentric heel raises this session which were performed at beginning of session  She reports some discomfort with standing calf stretch, but symptoms resolve once relaxing  She has improved standing balance with hip strengthening exercises  Will continue to progress as she is able to tolerate  Plan: Progress treatment as tolerated         Diagnosis: R achilles tendinopathy   Precautions: chronicity   Primary Goals: 1) R ankle DF ROM  2) R achilles strength  3) R hip strength   *asterisks by exercise = given for HEP   Manuals 9/22 9/27 9/13 9/15 9/20   R achilles and heel IASTM KLS, PTA KLS, PTA   KLS, PTA  IM, PT   DF mobs        Heel taping KLS, PTA  IM, PT R heel KLS, PTA IM, PT                   There Ex        Bike 5 min 5 min bike  5 min bike 5 min bike     Wall calf S* 3x30 sec  3x30 sec   3x30 sec  3x30 sec   Wall soleus S        Plantar fascia S*        DF MWM        Towel calf S*   3x30 sec ea     BAPS board   Large x 20 ea direction     4 way ankle                Neuro Re-Ed        Towel scrunches*        Short foot*        Heel raise eccentrics seated* 10# 3x10 seated  10# 3x10 seated  10# 3x10 seated susan 10# 3x10 seated susan Heel raise eccentrics leg press  40# 2x10 up with the L down with the R  35# 3x15 up with 2 down with R 40# 2x10 up with L down with the R     Clamshells         SLR   2x10 susan     Hip abd SLR        Standing hip abd and ext SLR 3x10 ea susan  3x10 ea susan  2x10 ea susan 3x10 ea susan     Bridges        X walks        SLS        Tandem stance 2x30 sec  2x30 sec   2x30 sec              Re-evaluation      IM, PT    Ther Act                             Modalities         Ice

## 2021-09-29 ENCOUNTER — OFFICE VISIT (OUTPATIENT)
Dept: PHYSICAL THERAPY | Facility: CLINIC | Age: 71
End: 2021-09-29
Payer: COMMERCIAL

## 2021-09-29 DIAGNOSIS — M67.971 DISORDER OF RIGHT ACHILLES TENDON: Primary | ICD-10-CM

## 2021-09-29 PROCEDURE — 97140 MANUAL THERAPY 1/> REGIONS: CPT

## 2021-09-29 PROCEDURE — 97112 NEUROMUSCULAR REEDUCATION: CPT

## 2021-09-29 PROCEDURE — 97110 THERAPEUTIC EXERCISES: CPT

## 2021-09-29 NOTE — PROGRESS NOTES
Daily Note     Today's date: 2021  Patient name: Paddy Abebe  : 1950  MRN: [id number]  Referring provider: Manfred Hicks MD  Dx:   Encounter Diagnosis     ICD-10-CM    1  Disorder of right Achilles tendon  M67 971        Start Time: 1705  Stop Time: 1750  Total time in clinic (min): 45 minutes    Subjective: Patient states she was sore following last session, but this went away  She states yesterday she felt good  Today she feels pretty good, but she is a little sore  Objective: See treatment diary below      Assessment: Continued with outlined program  Patient demonstrates improved tolerance to WB exercises this visit  She reports minimal pain with eccentric heel raises on LP  Patient has no complaints of pain with tandem stance  She is progressing well towards goals  Plan: Progress treatment as tolerated         Diagnosis: R achilles tendinopathy   Precautions: chronicity   Primary Goals: 1) R ankle DF ROM  2) R achilles strength  3) R hip strength   *asterisks by exercise = given for HEP   Manuals 9/22 9/27 9/29 9/15 9/20   R achilles and heel IASTM KLS, PTA KLS, PTA  KLS, PTA  KLS, PTA  IM, PT   DF mobs        Heel taping KLS, PTA   KLS, PTA IM, PT                   There Ex        Bike 5 min 5 min bike  5 min bike  5 min bike     Wall calf S* 3x30 sec  3x30 sec  3x30 sec  3x30 sec  3x30 sec   Wall soleus S        Plantar fascia S*        DF MWM        Towel calf S*        BAPS board        4 way ankle                Neuro Re-Ed        Towel scrunches*        Short foot*        Heel raise eccentrics seated* 10# 3x10 seated  10# 3x10 seated  10# 3x10 seated  10# 3x10 seated susan     Heel raise eccentrics leg press  40# 2x10 up with the L down with the R  40# 2x10 up with L down with the R  40# 2x10 up with L down with the R     Clamshells         SLR        Hip abd SLR        Standing hip abd and ext SLR 3x10 ea susan  3x10 ea susan  3x10 ea susan 3x10 ea susan     Bridges        X walks SLS        Tandem stance 2x30 sec  2x30 sec  2x30 sec  2x30 sec                              Re-evaluation      IM, PT    Ther Act                             Modalities         Ice

## 2021-10-04 ENCOUNTER — OFFICE VISIT (OUTPATIENT)
Dept: PHYSICAL THERAPY | Facility: CLINIC | Age: 71
End: 2021-10-04
Payer: COMMERCIAL

## 2021-10-04 DIAGNOSIS — M67.971 DISORDER OF RIGHT ACHILLES TENDON: Primary | ICD-10-CM

## 2021-10-04 PROCEDURE — 97110 THERAPEUTIC EXERCISES: CPT | Performed by: PHYSICAL THERAPIST

## 2021-10-04 PROCEDURE — 97112 NEUROMUSCULAR REEDUCATION: CPT | Performed by: PHYSICAL THERAPIST

## 2021-10-06 ENCOUNTER — OFFICE VISIT (OUTPATIENT)
Dept: PHYSICAL THERAPY | Facility: CLINIC | Age: 71
End: 2021-10-06
Payer: COMMERCIAL

## 2021-10-06 DIAGNOSIS — M67.971 DISORDER OF RIGHT ACHILLES TENDON: Primary | ICD-10-CM

## 2021-10-06 PROCEDURE — 97112 NEUROMUSCULAR REEDUCATION: CPT

## 2021-10-06 PROCEDURE — 97140 MANUAL THERAPY 1/> REGIONS: CPT

## 2021-10-11 ENCOUNTER — APPOINTMENT (OUTPATIENT)
Dept: PHYSICAL THERAPY | Facility: CLINIC | Age: 71
End: 2021-10-11
Payer: COMMERCIAL

## 2021-10-13 ENCOUNTER — APPOINTMENT (OUTPATIENT)
Dept: PHYSICAL THERAPY | Facility: CLINIC | Age: 71
End: 2021-10-13
Payer: COMMERCIAL

## 2021-10-18 ENCOUNTER — OFFICE VISIT (OUTPATIENT)
Dept: PHYSICAL THERAPY | Facility: CLINIC | Age: 71
End: 2021-10-18
Payer: COMMERCIAL

## 2021-10-18 DIAGNOSIS — M67.971 DISORDER OF RIGHT ACHILLES TENDON: Primary | ICD-10-CM

## 2021-10-18 PROCEDURE — 97112 NEUROMUSCULAR REEDUCATION: CPT

## 2021-10-18 PROCEDURE — 97110 THERAPEUTIC EXERCISES: CPT

## 2021-10-18 PROCEDURE — 97140 MANUAL THERAPY 1/> REGIONS: CPT

## 2021-10-20 ENCOUNTER — EVALUATION (OUTPATIENT)
Dept: PHYSICAL THERAPY | Facility: CLINIC | Age: 71
End: 2021-10-20
Payer: COMMERCIAL

## 2021-10-20 DIAGNOSIS — M67.971 DISORDER OF RIGHT ACHILLES TENDON: Primary | ICD-10-CM

## 2021-10-20 PROCEDURE — 97110 THERAPEUTIC EXERCISES: CPT | Performed by: PHYSICAL THERAPIST

## 2021-10-20 PROCEDURE — 97140 MANUAL THERAPY 1/> REGIONS: CPT | Performed by: PHYSICAL THERAPIST

## 2021-10-25 ENCOUNTER — OFFICE VISIT (OUTPATIENT)
Dept: PHYSICAL THERAPY | Facility: CLINIC | Age: 71
End: 2021-10-25
Payer: COMMERCIAL

## 2021-10-25 DIAGNOSIS — M67.971 DISORDER OF RIGHT ACHILLES TENDON: Primary | ICD-10-CM

## 2021-10-25 PROCEDURE — 97110 THERAPEUTIC EXERCISES: CPT

## 2021-10-25 PROCEDURE — 97112 NEUROMUSCULAR REEDUCATION: CPT

## 2021-10-25 PROCEDURE — 97140 MANUAL THERAPY 1/> REGIONS: CPT

## 2021-10-27 ENCOUNTER — OFFICE VISIT (OUTPATIENT)
Dept: PHYSICAL THERAPY | Facility: CLINIC | Age: 71
End: 2021-10-27
Payer: COMMERCIAL

## 2021-10-27 DIAGNOSIS — M67.971 DISORDER OF RIGHT ACHILLES TENDON: Primary | ICD-10-CM

## 2021-10-27 PROCEDURE — 97140 MANUAL THERAPY 1/> REGIONS: CPT

## 2021-10-27 PROCEDURE — 97110 THERAPEUTIC EXERCISES: CPT

## 2021-10-27 PROCEDURE — 97112 NEUROMUSCULAR REEDUCATION: CPT

## 2021-11-03 ENCOUNTER — APPOINTMENT (OUTPATIENT)
Dept: PHYSICAL THERAPY | Facility: CLINIC | Age: 71
End: 2021-11-03
Payer: COMMERCIAL

## 2021-11-08 ENCOUNTER — OFFICE VISIT (OUTPATIENT)
Dept: PHYSICAL THERAPY | Facility: CLINIC | Age: 71
End: 2021-11-08
Payer: COMMERCIAL

## 2021-11-08 DIAGNOSIS — M67.971 DISORDER OF RIGHT ACHILLES TENDON: Primary | ICD-10-CM

## 2021-11-08 PROCEDURE — 97112 NEUROMUSCULAR REEDUCATION: CPT

## 2021-11-08 PROCEDURE — 97140 MANUAL THERAPY 1/> REGIONS: CPT

## 2021-11-08 PROCEDURE — 97110 THERAPEUTIC EXERCISES: CPT

## 2021-11-10 ENCOUNTER — OFFICE VISIT (OUTPATIENT)
Dept: PHYSICAL THERAPY | Facility: CLINIC | Age: 71
End: 2021-11-10
Payer: COMMERCIAL

## 2021-11-10 DIAGNOSIS — M67.971 DISORDER OF RIGHT ACHILLES TENDON: Primary | ICD-10-CM

## 2021-11-10 PROCEDURE — 97112 NEUROMUSCULAR REEDUCATION: CPT

## 2021-11-10 PROCEDURE — 97140 MANUAL THERAPY 1/> REGIONS: CPT

## 2021-11-10 PROCEDURE — 97110 THERAPEUTIC EXERCISES: CPT

## 2021-11-15 ENCOUNTER — OFFICE VISIT (OUTPATIENT)
Dept: URGENT CARE | Facility: CLINIC | Age: 71
End: 2021-11-15
Payer: COMMERCIAL

## 2021-11-15 ENCOUNTER — OFFICE VISIT (OUTPATIENT)
Dept: PHYSICAL THERAPY | Facility: CLINIC | Age: 71
End: 2021-11-15
Payer: COMMERCIAL

## 2021-11-15 VITALS
TEMPERATURE: 98.7 F | BODY MASS INDEX: 36.1 KG/M2 | OXYGEN SATURATION: 98 % | WEIGHT: 230 LBS | DIASTOLIC BLOOD PRESSURE: 70 MMHG | RESPIRATION RATE: 16 BRPM | SYSTOLIC BLOOD PRESSURE: 158 MMHG | HEART RATE: 89 BPM | HEIGHT: 67 IN

## 2021-11-15 DIAGNOSIS — R35.0 URINARY FREQUENCY: Primary | ICD-10-CM

## 2021-11-15 DIAGNOSIS — M67.971 DISORDER OF RIGHT ACHILLES TENDON: Primary | ICD-10-CM

## 2021-11-15 LAB
SL AMB  POCT GLUCOSE, UA: NEGATIVE
SL AMB LEUKOCYTE ESTERASE,UA: ABNORMAL
SL AMB POCT BILIRUBIN,UA: NEGATIVE
SL AMB POCT BLOOD,UA: ABNORMAL
SL AMB POCT CLARITY,UA: ABNORMAL
SL AMB POCT COLOR,UA: ABNORMAL
SL AMB POCT KETONES,UA: ABNORMAL
SL AMB POCT NITRITE,UA: NEGATIVE
SL AMB POCT PH,UA: 6
SL AMB POCT SPECIFIC GRAVITY,UA: 1.02
SL AMB POCT URINE PROTEIN: 100
SL AMB POCT UROBILINOGEN: 0.2

## 2021-11-15 PROCEDURE — 97110 THERAPEUTIC EXERCISES: CPT

## 2021-11-15 PROCEDURE — 97140 MANUAL THERAPY 1/> REGIONS: CPT

## 2021-11-15 PROCEDURE — 97112 NEUROMUSCULAR REEDUCATION: CPT

## 2021-11-15 PROCEDURE — 87186 SC STD MICRODIL/AGAR DIL: CPT

## 2021-11-15 PROCEDURE — S9083 URGENT CARE CENTER GLOBAL: HCPCS

## 2021-11-15 PROCEDURE — 87086 URINE CULTURE/COLONY COUNT: CPT

## 2021-11-15 PROCEDURE — G0382 LEV 3 HOSP TYPE B ED VISIT: HCPCS

## 2021-11-15 PROCEDURE — 81002 URINALYSIS NONAUTO W/O SCOPE: CPT

## 2021-11-15 PROCEDURE — 87077 CULTURE AEROBIC IDENTIFY: CPT

## 2021-11-15 RX ORDER — PSEUDOEPHEDRINE HCL 30 MG
100 TABLET ORAL 2 TIMES DAILY
COMMUNITY

## 2021-11-15 RX ORDER — TOLTERODINE 4 MG/1
4 CAPSULE, EXTENDED RELEASE ORAL DAILY
COMMUNITY
Start: 2021-09-09

## 2021-11-15 RX ORDER — DIPHENHYDRAMINE HCL 25 MG
25 CAPSULE ORAL EVERY 6 HOURS PRN
COMMUNITY

## 2021-11-15 RX ORDER — NITROFURANTOIN 25; 75 MG/1; MG/1
100 CAPSULE ORAL 2 TIMES DAILY
Qty: 10 CAPSULE | Refills: 0 | Status: SHIPPED | OUTPATIENT
Start: 2021-11-15 | End: 2021-11-20

## 2021-11-17 ENCOUNTER — OFFICE VISIT (OUTPATIENT)
Dept: PHYSICAL THERAPY | Facility: CLINIC | Age: 71
End: 2021-11-17
Payer: COMMERCIAL

## 2021-11-17 DIAGNOSIS — M67.971 DISORDER OF RIGHT ACHILLES TENDON: Primary | ICD-10-CM

## 2021-11-17 LAB
BACTERIA UR CULT: ABNORMAL
BACTERIA UR CULT: ABNORMAL

## 2021-11-17 PROCEDURE — 97112 NEUROMUSCULAR REEDUCATION: CPT

## 2021-11-17 PROCEDURE — 97140 MANUAL THERAPY 1/> REGIONS: CPT

## 2021-11-17 PROCEDURE — 97110 THERAPEUTIC EXERCISES: CPT

## 2021-11-22 ENCOUNTER — EVALUATION (OUTPATIENT)
Dept: PHYSICAL THERAPY | Facility: CLINIC | Age: 71
End: 2021-11-22
Payer: COMMERCIAL

## 2021-11-22 DIAGNOSIS — M67.971 DISORDER OF RIGHT ACHILLES TENDON: Primary | ICD-10-CM

## 2021-11-22 PROCEDURE — 97140 MANUAL THERAPY 1/> REGIONS: CPT | Performed by: PHYSICAL THERAPIST

## 2021-11-22 PROCEDURE — 97530 THERAPEUTIC ACTIVITIES: CPT | Performed by: PHYSICAL THERAPIST

## 2021-11-24 ENCOUNTER — APPOINTMENT (OUTPATIENT)
Dept: PHYSICAL THERAPY | Facility: CLINIC | Age: 71
End: 2021-11-24
Payer: COMMERCIAL

## 2021-12-15 ENCOUNTER — TRANSCRIBE ORDERS (OUTPATIENT)
Dept: LAB | Facility: CLINIC | Age: 71
End: 2021-12-15

## 2021-12-15 ENCOUNTER — APPOINTMENT (OUTPATIENT)
Dept: LAB | Facility: CLINIC | Age: 71
End: 2021-12-15
Payer: COMMERCIAL

## 2021-12-15 DIAGNOSIS — I48.91 ATRIAL FIBRILLATION, UNSPECIFIED TYPE (HCC): Primary | ICD-10-CM

## 2021-12-15 DIAGNOSIS — Z79.810 CARE RELATED TO CURRENT TAMOXIFEN USE: Primary | ICD-10-CM

## 2021-12-15 DIAGNOSIS — Z90.11 STATUS POST RIGHT MASTECTOMY: Primary | ICD-10-CM

## 2021-12-15 DIAGNOSIS — C50.911 MALIGNANT NEOPLASM OF RIGHT FEMALE BREAST, UNSPECIFIED ESTROGEN RECEPTOR STATUS, UNSPECIFIED SITE OF BREAST (HCC): ICD-10-CM

## 2021-12-15 DIAGNOSIS — I48.91 ATRIAL FIBRILLATION, UNSPECIFIED TYPE (HCC): ICD-10-CM

## 2021-12-15 DIAGNOSIS — Z79.810 CARE RELATED TO CURRENT TAMOXIFEN USE: ICD-10-CM

## 2021-12-15 DIAGNOSIS — Z90.11 STATUS POST RIGHT MASTECTOMY: ICD-10-CM

## 2021-12-15 LAB
ALBUMIN SERPL BCP-MCNC: 3.5 G/DL (ref 3.5–5)
ALP SERPL-CCNC: 76 U/L (ref 46–116)
ALT SERPL W P-5'-P-CCNC: 42 U/L (ref 12–78)
ANION GAP SERPL CALCULATED.3IONS-SCNC: 4 MMOL/L (ref 4–13)
AST SERPL W P-5'-P-CCNC: 41 U/L (ref 5–45)
BASOPHILS # BLD AUTO: 0.07 THOUSANDS/ΜL (ref 0–0.1)
BASOPHILS NFR BLD AUTO: 1 % (ref 0–1)
BILIRUB SERPL-MCNC: 0.71 MG/DL (ref 0.2–1)
BUN SERPL-MCNC: 11 MG/DL (ref 5–25)
CALCIUM SERPL-MCNC: 9 MG/DL (ref 8.3–10.1)
CANCER AG27-29 SERPL-ACNC: 12.4 U/ML (ref 0–42.3)
CHLORIDE SERPL-SCNC: 111 MMOL/L (ref 100–108)
CO2 SERPL-SCNC: 26 MMOL/L (ref 21–32)
CREAT SERPL-MCNC: 0.81 MG/DL (ref 0.6–1.3)
DIGOXIN SERPL-MCNC: 0.4 NG/ML (ref 0.8–2)
EOSINOPHIL # BLD AUTO: 0.15 THOUSAND/ΜL (ref 0–0.61)
EOSINOPHIL NFR BLD AUTO: 2 % (ref 0–6)
ERYTHROCYTE [DISTWIDTH] IN BLOOD BY AUTOMATED COUNT: 14.9 % (ref 11.6–15.1)
GFR SERPL CREATININE-BSD FRML MDRD: 74 ML/MIN/1.73SQ M
GGT SERPL-CCNC: 58 U/L (ref 5–85)
GLUCOSE P FAST SERPL-MCNC: 114 MG/DL (ref 65–99)
HCT VFR BLD AUTO: 41 % (ref 34.8–46.1)
HGB BLD-MCNC: 12.7 G/DL (ref 11.5–15.4)
IMM GRANULOCYTES # BLD AUTO: 0.03 THOUSAND/UL (ref 0–0.2)
IMM GRANULOCYTES NFR BLD AUTO: 0 % (ref 0–2)
LYMPHOCYTES # BLD AUTO: 2.45 THOUSANDS/ΜL (ref 0.6–4.47)
LYMPHOCYTES NFR BLD AUTO: 29 % (ref 14–44)
MCH RBC QN AUTO: 28.5 PG (ref 26.8–34.3)
MCHC RBC AUTO-ENTMCNC: 31 G/DL (ref 31.4–37.4)
MCV RBC AUTO: 92 FL (ref 82–98)
MONOCYTES # BLD AUTO: 0.48 THOUSAND/ΜL (ref 0.17–1.22)
MONOCYTES NFR BLD AUTO: 6 % (ref 4–12)
NEUTROPHILS # BLD AUTO: 5.39 THOUSANDS/ΜL (ref 1.85–7.62)
NEUTS SEG NFR BLD AUTO: 62 % (ref 43–75)
NRBC BLD AUTO-RTO: 0 /100 WBCS
PLATELET # BLD AUTO: 126 THOUSANDS/UL (ref 149–390)
PMV BLD AUTO: 12.7 FL (ref 8.9–12.7)
POTASSIUM SERPL-SCNC: 4.4 MMOL/L (ref 3.5–5.3)
PROT SERPL-MCNC: 7.4 G/DL (ref 6.4–8.2)
RBC # BLD AUTO: 4.45 MILLION/UL (ref 3.81–5.12)
SODIUM SERPL-SCNC: 141 MMOL/L (ref 136–145)
WBC # BLD AUTO: 8.57 THOUSAND/UL (ref 4.31–10.16)

## 2021-12-15 PROCEDURE — 82977 ASSAY OF GGT: CPT

## 2021-12-15 PROCEDURE — 80053 COMPREHEN METABOLIC PANEL: CPT

## 2021-12-15 PROCEDURE — 86300 IMMUNOASSAY TUMOR CA 15-3: CPT

## 2021-12-15 PROCEDURE — 85025 COMPLETE CBC W/AUTO DIFF WBC: CPT

## 2021-12-15 PROCEDURE — 36415 COLL VENOUS BLD VENIPUNCTURE: CPT

## 2021-12-15 PROCEDURE — 80162 ASSAY OF DIGOXIN TOTAL: CPT

## 2022-03-23 ENCOUNTER — APPOINTMENT (OUTPATIENT)
Dept: LAB | Facility: CLINIC | Age: 72
End: 2022-03-23
Payer: COMMERCIAL

## 2022-03-23 ENCOUNTER — TRANSCRIBE ORDERS (OUTPATIENT)
Dept: LAB | Facility: CLINIC | Age: 72
End: 2022-03-23

## 2022-03-23 DIAGNOSIS — Z79.810 CARE RELATED TO CURRENT TAMOXIFEN USE: ICD-10-CM

## 2022-03-23 DIAGNOSIS — Z17.0 ESTROGEN RECEPTOR POSITIVE: ICD-10-CM

## 2022-03-23 DIAGNOSIS — C50.911 MALIGNANT NEOPLASM OF RIGHT FEMALE BREAST, UNSPECIFIED ESTROGEN RECEPTOR STATUS, UNSPECIFIED SITE OF BREAST (HCC): ICD-10-CM

## 2022-03-23 DIAGNOSIS — N39.0 URINARY TRACT INFECTION WITHOUT HEMATURIA, SITE UNSPECIFIED: Primary | ICD-10-CM

## 2022-03-23 DIAGNOSIS — C50.511 MALIGNANT NEOPLASM OF LOWER-OUTER QUADRANT OF RIGHT FEMALE BREAST, UNSPECIFIED ESTROGEN RECEPTOR STATUS (HCC): ICD-10-CM

## 2022-03-23 DIAGNOSIS — Z98.890 PERSONAL HISTORY OF SURGERY TO HEART AND GREAT VESSELS, PRESENTING HAZARDS TO HEALTH: ICD-10-CM

## 2022-03-23 DIAGNOSIS — N39.0 URINARY TRACT INFECTION WITHOUT HEMATURIA, SITE UNSPECIFIED: ICD-10-CM

## 2022-03-23 DIAGNOSIS — Z90.11 STATUS POST RIGHT MASTECTOMY: ICD-10-CM

## 2022-03-23 LAB
ALBUMIN SERPL BCP-MCNC: 3.4 G/DL (ref 3.5–5)
ALP SERPL-CCNC: 75 U/L (ref 46–116)
ALT SERPL W P-5'-P-CCNC: 54 U/L (ref 12–78)
ANION GAP SERPL CALCULATED.3IONS-SCNC: 5 MMOL/L (ref 4–13)
AST SERPL W P-5'-P-CCNC: 52 U/L (ref 5–45)
BACTERIA UR QL AUTO: ABNORMAL /HPF
BASOPHILS # BLD AUTO: 0.05 THOUSANDS/ΜL (ref 0–0.1)
BASOPHILS NFR BLD AUTO: 1 % (ref 0–1)
BILIRUB SERPL-MCNC: 0.72 MG/DL (ref 0.2–1)
BILIRUB UR QL STRIP: NEGATIVE
BUN SERPL-MCNC: 8 MG/DL (ref 5–25)
CALCIUM ALBUM COR SERPL-MCNC: 9.3 MG/DL (ref 8.3–10.1)
CALCIUM SERPL-MCNC: 8.8 MG/DL (ref 8.3–10.1)
CANCER AG27-29 SERPL-ACNC: 15.5 U/ML (ref 0–42.3)
CHLORIDE SERPL-SCNC: 112 MMOL/L (ref 100–108)
CLARITY UR: CLEAR
CO2 SERPL-SCNC: 22 MMOL/L (ref 21–32)
COLOR UR: YELLOW
CREAT SERPL-MCNC: 0.86 MG/DL (ref 0.6–1.3)
EOSINOPHIL # BLD AUTO: 0.09 THOUSAND/ΜL (ref 0–0.61)
EOSINOPHIL NFR BLD AUTO: 1 % (ref 0–6)
ERYTHROCYTE [DISTWIDTH] IN BLOOD BY AUTOMATED COUNT: 15.2 % (ref 11.6–15.1)
GFR SERPL CREATININE-BSD FRML MDRD: 69 ML/MIN/1.73SQ M
GGT SERPL-CCNC: 45 U/L (ref 5–85)
GLUCOSE P FAST SERPL-MCNC: 135 MG/DL (ref 65–99)
GLUCOSE UR STRIP-MCNC: NEGATIVE MG/DL
HCT VFR BLD AUTO: 38.8 % (ref 34.8–46.1)
HGB BLD-MCNC: 12.7 G/DL (ref 11.5–15.4)
HGB UR QL STRIP.AUTO: NEGATIVE
IMM GRANULOCYTES # BLD AUTO: 0.02 THOUSAND/UL (ref 0–0.2)
IMM GRANULOCYTES NFR BLD AUTO: 0 % (ref 0–2)
KETONES UR STRIP-MCNC: NEGATIVE MG/DL
LEUKOCYTE ESTERASE UR QL STRIP: ABNORMAL
LYMPHOCYTES # BLD AUTO: 1.89 THOUSANDS/ΜL (ref 0.6–4.47)
LYMPHOCYTES NFR BLD AUTO: 27 % (ref 14–44)
MCH RBC QN AUTO: 28.5 PG (ref 26.8–34.3)
MCHC RBC AUTO-ENTMCNC: 32.7 G/DL (ref 31.4–37.4)
MCV RBC AUTO: 87 FL (ref 82–98)
MONOCYTES # BLD AUTO: 0.29 THOUSAND/ΜL (ref 0.17–1.22)
MONOCYTES NFR BLD AUTO: 4 % (ref 4–12)
MUCOUS THREADS UR QL AUTO: ABNORMAL
NEUTROPHILS # BLD AUTO: 4.55 THOUSANDS/ΜL (ref 1.85–7.62)
NEUTS SEG NFR BLD AUTO: 67 % (ref 43–75)
NITRITE UR QL STRIP: POSITIVE
NON-SQ EPI CELLS URNS QL MICRO: ABNORMAL /HPF
NRBC BLD AUTO-RTO: 0 /100 WBCS
PH UR STRIP.AUTO: 5.5 [PH]
PLATELET # BLD AUTO: 112 THOUSANDS/UL (ref 149–390)
PMV BLD AUTO: 12.8 FL (ref 8.9–12.7)
POTASSIUM SERPL-SCNC: 3.7 MMOL/L (ref 3.5–5.3)
PROT SERPL-MCNC: 6.9 G/DL (ref 6.4–8.2)
PROT UR STRIP-MCNC: ABNORMAL MG/DL
RBC # BLD AUTO: 4.45 MILLION/UL (ref 3.81–5.12)
RBC #/AREA URNS AUTO: ABNORMAL /HPF
SODIUM SERPL-SCNC: 139 MMOL/L (ref 136–145)
SP GR UR STRIP.AUTO: 1.02 (ref 1–1.03)
UROBILINOGEN UR STRIP-ACNC: <2 MG/DL
WBC # BLD AUTO: 6.89 THOUSAND/UL (ref 4.31–10.16)
WBC #/AREA URNS AUTO: ABNORMAL /HPF

## 2022-03-23 PROCEDURE — 87077 CULTURE AEROBIC IDENTIFY: CPT

## 2022-03-23 PROCEDURE — 87086 URINE CULTURE/COLONY COUNT: CPT

## 2022-03-23 PROCEDURE — 87186 SC STD MICRODIL/AGAR DIL: CPT

## 2022-03-23 PROCEDURE — 85025 COMPLETE CBC W/AUTO DIFF WBC: CPT

## 2022-03-23 PROCEDURE — 86300 IMMUNOASSAY TUMOR CA 15-3: CPT

## 2022-03-23 PROCEDURE — 81001 URINALYSIS AUTO W/SCOPE: CPT

## 2022-03-23 PROCEDURE — 80053 COMPREHEN METABOLIC PANEL: CPT

## 2022-03-23 PROCEDURE — 82977 ASSAY OF GGT: CPT

## 2022-03-25 LAB — BACTERIA UR CULT: ABNORMAL

## 2022-03-29 ENCOUNTER — OFFICE VISIT (OUTPATIENT)
Dept: URGENT CARE | Facility: CLINIC | Age: 72
End: 2022-03-29
Payer: COMMERCIAL

## 2022-03-29 VITALS
TEMPERATURE: 97.8 F | SYSTOLIC BLOOD PRESSURE: 138 MMHG | WEIGHT: 235 LBS | BODY MASS INDEX: 37.77 KG/M2 | RESPIRATION RATE: 16 BRPM | DIASTOLIC BLOOD PRESSURE: 63 MMHG | OXYGEN SATURATION: 98 % | HEIGHT: 66 IN | HEART RATE: 86 BPM

## 2022-03-29 DIAGNOSIS — H10.33 ACUTE CONJUNCTIVITIS OF BOTH EYES, UNSPECIFIED ACUTE CONJUNCTIVITIS TYPE: Primary | ICD-10-CM

## 2022-03-29 PROCEDURE — 99214 OFFICE O/P EST MOD 30 MIN: CPT | Performed by: FAMILY MEDICINE

## 2022-03-29 RX ORDER — OLOPATADINE HYDROCHLORIDE 1 MG/ML
1 SOLUTION/ DROPS OPHTHALMIC 2 TIMES DAILY
Qty: 5 ML | Refills: 0 | Status: SHIPPED | OUTPATIENT
Start: 2022-03-29

## 2022-03-29 RX ORDER — CIPROFLOXACIN 500 MG/1
TABLET, FILM COATED ORAL
COMMUNITY
Start: 2022-03-25

## 2022-03-29 RX ORDER — LORATADINE 10 MG/1
10 TABLET ORAL DAILY
Qty: 30 TABLET | Refills: 0 | Status: SHIPPED | OUTPATIENT
Start: 2022-03-29

## 2022-03-29 NOTE — PROGRESS NOTES
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  Avoid rubbing eye and wash hands frequently  Likely secondary to allergies  Will give allergy medications as well  Follow-up with PCP in the next 3-5 days if no improvement  Go to the ED if symptoms severely worsen  Chief Complaint     Chief Complaint   Patient presents with    Conjunctivitis     b/l eyes pink/swollen/discharge x 1 day, used eye wash/benadryl         History of Present Illness     Kinga Rodas is a 73 y o  female presenting to the office today for eye discharge  Symptoms have been present for 2 days, and include itchy, watery, red eyes      She has tried eye drops for dry eyes for her symptoms, with no relief  Sick contacts include: none  Recent travel: none    Review of Systems     Review of Systems   Constitutional: Negative for chills and fever  HENT: Negative for congestion, facial swelling, postnasal drip, sinus pressure, sinus pain and sore throat  Eyes: Positive for discharge, redness and itching  Negative for pain  Respiratory: Negative for cough and shortness of breath  Skin: Negative for rash and wound  Allergic/Immunologic: Negative for environmental allergies  Neurological: Negative for dizziness, facial asymmetry, light-headedness and headaches  Psychiatric/Behavioral: Negative for agitation  The patient is not nervous/anxious          Current Medications       Current Outpatient Medications:     ALPRAZolam (XANAX) 0 25 mg tablet, , Disp: , Rfl:     amLODIPine Besylate (NORVASC PO), Take by mouth, Disp: , Rfl:     aspirin 81 MG tablet, Take by mouth, Disp: , Rfl:     Cholecalciferol 25 MCG (1000 UT) tablet, Take 1,000 Units by mouth daily, Disp: , Rfl:     ciprofloxacin (CIPRO) 500 mg tablet, , Disp: , Rfl:     digoxin (LANOXIN) 0 25 mg tablet, TAKE 1 TABLET BY MOUTH EVERY DAY, Disp: , Rfl:     Docusate Sodium (DSS) 100 MG CAPS, Take 100 mg by mouth 2 (two) times a day, Disp: , Rfl:     pramipexole (MIRAPEX) 0 25 mg tablet, Take by mouth, Disp: , Rfl:     Pramipexole Dihydrochloride ER 0 375 MG TB24, TAKE 1 TABLET BY MOUTH EVERY DAY, Disp: , Rfl:     rosuvastatin (CRESTOR) 10 MG tablet, Take by mouth, Disp: , Rfl:     spironolactone (ALDACTONE) 50 mg tablet, Take by mouth, Disp: , Rfl:     tamoxifen (NOLVADEX) 20 mg tablet, TAIKE 1 TABLET BY MOUTH DAILY, Disp: , Rfl:     tolterodine (DETROL LA) 4 mg 24 hr capsule, Take 4 mg by mouth daily, Disp: , Rfl:     venlafaxine (EFFEXOR-XR) 75 mg 24 hr capsule, Take 75 mg by mouth daily, Disp: , Rfl:     azithromycin (AZASITE) 1 % ophthalmic solution, Apply 1 ggt in eye(s) bid x2 days, then qd x5 days, Disp: 2 5 mL, Rfl: 0    diphenhydrAMINE (BENADRYL) 25 mg capsule, Take 25 mg by mouth every 6 (six) hours as needed, Disp: , Rfl:     esomeprazole (NEXIUM) 40 MG capsule, Take by mouth (Patient not taking: Reported on 3/29/2022 ), Disp: , Rfl:     loratadine (CLARITIN) 10 mg tablet, Take 1 tablet (10 mg total) by mouth daily, Disp: 30 tablet, Rfl: 0    olopatadine (PATANOL) 0 1 % ophthalmic solution, Administer 1 drop to both eyes 2 (two) times a day, Disp: 5 mL, Rfl: 0    phenazopyridine (PYRIDIUM) 100 mg tablet, Take 1 tablet (100 mg total) by mouth 3 (three) times a day as needed for bladder spasms, Disp: 10 tablet, Rfl: 0    predniSONE 10 mg tablet, TAKE 4 TAB PO DAILY X 3 DAYS, THEN 3 TAB DAILY X 2 DAYS, THEN 2 TAB DAILY X 2 DAYS, THEN 1 TAB DAILY X 2 DAYS, WITH FOOD (Patient not taking: Reported on 3/29/2022 ), Disp: 24 tablet, Rfl: 0    sulfacetamide-prednisolone (BLEPHAMIDE) 10-0 2 % ophthalmic suspension, Administer 1 drop to both eyes every 3 (three) hours (Patient not taking: Reported on 3/29/2022 ), Disp: 10 mL, Rfl: 0    Current Allergies     Allergies as of 03/29/2022 - Reviewed 03/29/2022   Allergen Reaction Noted    Celecoxib  09/19/2016    Neomycin-bacitracin zn-polymyx Other (See Comments) 09/18/2014    Rofecoxib  09/18/2014    Tape  [medical tape]  09/18/2014    Tetanus antitoxin  09/18/2014            The following portions of the patient's history were reviewed and updated as appropriate: allergies, current medications, past family history, past medical history, past social history, past surgical history and problem list      History reviewed  No pertinent past medical history  Past Surgical History:   Procedure Laterality Date    MAMMO NEEDLE LOCALIZATION RIGHT (ALL INC) Right 2/27/2015    MASTECTOMY Right 2016       Family History   Problem Relation Age of Onset    Arthritis Mother     Heart disease Father        Medications have been verified  Objective     /63   Pulse 86   Temp 97 8 °F (36 6 °C)   Resp 16   Ht 5' 6" (1 676 m)   Wt 107 kg (235 lb)   SpO2 98%   BMI 37 93 kg/m²   No LMP recorded  Patient is postmenopausal      Physical Exam     Physical Exam  Vitals reviewed  Constitutional:       General: She is not in acute distress  Appearance: Normal appearance  She is not ill-appearing  HENT:      Head: Normocephalic and atraumatic  Eyes:      General: Lids are normal  Vision grossly intact  Right eye: Discharge (watery) present  No hordeolum  Left eye: Discharge (watery) present  No hordeolum  Extraocular Movements: Extraocular movements intact  Conjunctiva/sclera:      Right eye: Right conjunctiva is injected  No exudate       Left eye: Left conjunctiva is injected  No exudate  Pulmonary:      Effort: Pulmonary effort is normal  No respiratory distress  Musculoskeletal:      Cervical back: Normal range of motion and neck supple  No tenderness  Skin:     General: Skin is warm  Neurological:      General: No focal deficit present  Mental Status: She is alert     Psychiatric:         Mood and Affect: Mood normal          Behavior: Behavior normal

## 2022-03-29 NOTE — PATIENT INSTRUCTIONS
Conjunctivitis   AMBULATORY CARE:   Conjunctivitis,  or pink eye, is inflammation of your conjunctiva  The conjunctiva is a thin tissue that covers the front of your eye and the back of your eyelids  The conjunctiva helps protect your eye and keep it moist  Conjunctivitis may be caused by bacteria, allergies, or a virus  If your conjunctivitis is caused by bacteria, it may get better on its own in about 7 days  Viral conjunctivitis can last up to 3 weeks  Common symptoms may include any of the following: You will usually have symptoms in both eyes if your conjunctivitis is caused by allergies  You may also have other allergic symptoms, such as a rash or runny nose  Symptoms will usually start in 1 eye if your conjunctivitis is caused by a virus or bacteria  · Redness in the whites of your eye    · Itching in your eye or around your eye    · Feeling like there is something in your eye    · Watery or thick, sticky discharge    · Crusty eyelids when you wake up in the morning    · Burning, stinging, or swelling in your eye    · Pain when you see bright light    Seek care immediately if:   · You have worsening eye pain  · The swelling in your eye gets worse, even after treatment  · Your vision suddenly becomes worse or you cannot see at all  Contact your healthcare provider if:   · You develop a fever and ear pain  · You have tiny bumps or spots of blood on your eye  · You have questions or concerns about your condition or care  Treatment  will depend on the cause of your conjunctivitis  You may need antibiotics or allergy medicine as a pill, eye drop, or eye ointment  Manage your symptoms:   · Apply a cool compress  Wet a washcloth with cold water and place it on your eye  This will help decrease itching and irritation  · Do not wear contact lenses  They can irritate your eye  Throw away the pair you are using and ask when you can wear them again   Use a new pair of lenses when your healthcare provider says it is okay  · Avoid irritants  Stay away from smoke filled areas  Shield your eyes from wind and sun  · Flush your eye  You may need to flush your eye with saline to help decrease your symptoms  Ask for more information on how to flush your eye  Medicines:  Treatment depends on what is causing your conjunctivitis  You may be given any of the following:  · Allergy medicine  helps decrease itchy, red, swollen eyes caused by allergies  It may be given as a pill, eye drops, or nasal spray  · Antibiotics  may be needed if your conjunctivitis is caused by bacteria  This medicine may be given as a pill, eye drops, or eye ointment  · Take your medicine as directed  Contact your healthcare provider if you think your medicine is not helping or if you have side effects  Tell him or her if you are allergic to any medicine  Keep a list of the medicines, vitamins, and herbs you take  Include the amounts, and when and why you take them  Bring the list or the pill bottles to follow-up visits  Carry your medicine list with you in case of an emergency  Prevent the spread of conjunctivitis:   · Wash your hands with soap and water often  Wash your hands before and after you touch your eyes  Also wash your hands before you prepare or eat food and after you use the bathroom or change a diaper  · Avoid allergens  Try to avoid the things that cause your allergies, such as pets, dust, or grass  · Avoid contact with others  Do not share towels or washcloths  Try to stay away from others as much as possible  Ask when you can return to work or school  · Throw away eye makeup  The bacteria that caused your conjunctivitis can stay in eye makeup  Throw away mascara and other eye makeup  © Copyright [employer] 2022 Information is for End User's use only and may not be sold, redistributed or otherwise used for commercial purposes   All illustrations and images included in CareNotes® are the copyrighted property of [employer]  or [employer]   The above information is an  only  It is not intended as medical advice for individual conditions or treatments  Talk to your doctor, nurse or pharmacist before following any medical regimen to see if it is safe and effective for you

## 2022-03-31 DIAGNOSIS — H10.33 ACUTE CONJUNCTIVITIS OF BOTH EYES, UNSPECIFIED ACUTE CONJUNCTIVITIS TYPE: ICD-10-CM

## 2022-03-31 RX ORDER — AZITHROMYCIN MONOHYDRATE 10 MG/ML
SOLUTION/ DROPS OPHTHALMIC
Qty: 2.5 ML | Refills: 0 | OUTPATIENT
Start: 2022-03-31

## 2022-03-31 RX ORDER — ERYTHROMYCIN 5 MG/G
0.5 OINTMENT OPHTHALMIC
Qty: 3.5 G | Refills: 0 | Status: SHIPPED | OUTPATIENT
Start: 2022-03-31

## 2022-08-29 ENCOUNTER — TELEPHONE (OUTPATIENT)
Dept: GASTROENTEROLOGY | Facility: CLINIC | Age: 72
End: 2022-08-29

## 2022-10-27 ENCOUNTER — OFFICE VISIT (OUTPATIENT)
Dept: GASTROENTEROLOGY | Facility: CLINIC | Age: 72
End: 2022-10-27
Payer: COMMERCIAL

## 2022-10-27 VITALS
HEIGHT: 66 IN | BODY MASS INDEX: 34.87 KG/M2 | DIASTOLIC BLOOD PRESSURE: 64 MMHG | HEART RATE: 104 BPM | SYSTOLIC BLOOD PRESSURE: 126 MMHG | WEIGHT: 217 LBS

## 2022-10-27 DIAGNOSIS — Z85.3 HISTORY OF BREAST CANCER: ICD-10-CM

## 2022-10-27 DIAGNOSIS — Z12.11 ENCOUNTER FOR SCREENING COLONOSCOPY: Primary | ICD-10-CM

## 2022-10-27 DIAGNOSIS — K76.0 FATTY LIVER: ICD-10-CM

## 2022-10-27 DIAGNOSIS — I48.0 PAROXYSMAL ATRIAL FIBRILLATION (HCC): ICD-10-CM

## 2022-10-27 PROCEDURE — 99244 OFF/OP CNSLTJ NEW/EST MOD 40: CPT | Performed by: INTERNAL MEDICINE

## 2022-10-27 RX ORDER — LISINOPRIL 5 MG/1
5 TABLET ORAL DAILY
COMMUNITY
Start: 2022-10-08

## 2022-10-27 RX ORDER — BLOOD SUGAR DIAGNOSTIC
STRIP MISCELLANEOUS
COMMUNITY
Start: 2022-08-29

## 2022-10-27 RX ORDER — SODIUM, POTASSIUM,MAG SULFATES 17.5-3.13G
177 SOLUTION, RECONSTITUTED, ORAL ORAL ONCE
Qty: 177 ML | Refills: 0 | Status: SHIPPED | OUTPATIENT
Start: 2022-10-27 | End: 2022-10-27

## 2022-10-27 RX ORDER — METFORMIN HYDROCHLORIDE 500 MG/1
TABLET, FILM COATED, EXTENDED RELEASE ORAL
COMMUNITY
Start: 2022-06-20

## 2022-10-27 RX ORDER — APIXABAN 5 MG/1
5 TABLET, FILM COATED ORAL 2 TIMES DAILY
COMMUNITY
Start: 2022-10-08

## 2022-10-27 RX ORDER — LANCETS 30 GAUGE
EACH MISCELLANEOUS
COMMUNITY
Start: 2022-09-21

## 2022-10-27 NOTE — PROGRESS NOTES
[facility] - Outpatient Consultation  Ant Cottrell 73 y o  female MRN: [id number]  Encounter: [id number]          ASSESSMENT AND PLAN:      1  Encounter for screening colonoscopy  Patient is presented for a screening colonoscopy  She never had a colonoscopy  Denies any change in bowel habits or rectal bleeding  Patient denies any mucus per rectum  There is family history of colon cancer one individual   1 of her grandparents had colon cancer  Patient personally had breast cancer  A colonoscopy will be scheduled  Prep was explained  Further recommendations will follow  - Colonoscopy; Future  - Na Sulfate-K Sulfate-Mg Sulf (Suprep Bowel Prep Kit) 17 5-3 13-1 6 GM/177ML SOLN; Take 177 mL by mouth once for 1 dose Take 177 mL by mouth once for 1 dose  Dispense: 177 mL; Refill: 0    2  Fatty liver  Patient has history of fatty liver  Ultrasound was done earlier this year  Currently she has hepatomegaly on palpation  Left lobe of the liver appears to be more enlarged and firm in the right lobe  Her liver function tests within normal limits  This is most likely secondary to her obesity and diabetes  Patient will also taking tamoxifen which may also cause fatty liver  She has taken steroids intermittently  Importance of fatty liver explained  For the observation is necessary  There is always a risk factor for cirrhosis of liver and steatohepatitis  Her liver functions have been normal however  3  Paroxysmal atrial fibrillation [facility])  Patient has history of proximal atrial fibrillation  She has been taking Eliquis  Currently her heart rate appears to be normal and regular rhythm  I have advised patient that she will have to stop Eliquis for 2-3 days  We will contact her cardiologist to get okay  She understands  4  History of breast cancer  Patient has personal history of breast cancer  She also has significant history of anxiety and depression    This has been ongoing since she has lost her spouse with multiple medical problems     - Na Sulfate-K Sulfate-Mg Sulf (Suprep Bowel Prep Kit) 17 5-3 13-1 6 GM/177ML SOLN; Take 177 mL by mouth once for 1 dose Take 177 mL by mouth once for 1 dose  Dispense: 177 mL; Refill: 0    ______________________________________________________________________    HPI:  Denies any significant complaints  Has presented for screening colonoscopy  All questions answered  Prep has been explained  Findings of hepatomegaly noted  Patient has history of fatty liver  Her liver function tests are within normal limits  She will need surveillance with ultrasound at least twice a year or may be CT scan of the liver  FibroScan may also be beneficial       REVIEW OF SYSTEMS:    CONSTITUTIONAL: Denies any fever, chills, rigors, and weight loss  HEENT: No earache or tinnitus  Denies hearing loss or visual disturbances  CARDIOVASCULAR: No chest pain or palpitations  RESPIRATORY: Denies any cough, hemoptysis, shortness of breath or dyspnea on exertion  GASTROINTESTINAL: As noted in the History of Present Illness  GENITOURINARY: No problems with urination  Denies any hematuria or dysuria  NEUROLOGIC: No dizziness or vertigo, denies headaches  MUSCULOSKELETAL: Denies any muscle or joint pain  SKIN: Denies skin rashes or itching  ENDOCRINE: Denies excessive thirst  Denies intolerance to heat or cold  PSYCHOSOCIAL: Denies depression or anxiety  Denies any recent memory loss  Historical Information   History reviewed  No pertinent past medical history    Past Surgical History:   Procedure Laterality Date   • MAMMO NEEDLE LOCALIZATION RIGHT (ALL INC) Right 2/27/2015   • MASTECTOMY Right 2016     Social History   Social History     Substance and Sexual Activity   Alcohol Use No     Social History     Substance and Sexual Activity   Drug Use No     Social History     Tobacco Use   Smoking Status Never Smoker   Smokeless Tobacco Never Used Family History   Problem Relation Age of Onset   • Arthritis Mother    • Heart disease Father        Meds/Allergies       Current Outpatient Medications:   •  Cholecalciferol 25 MCG (1000 UT) tablet  •  digoxin (LANOXIN) 0 25 mg tablet  •  Docusate Sodium (DSS) 100 MG CAPS  •  loratadine (CLARITIN) 10 mg tablet  •  metFORMIN (GLUMETZA) 500 MG (MOD) 24 hr tablet  •  Na Sulfate-K Sulfate-Mg Sulf (Suprep Bowel Prep Kit) 17 5-3 13-1 6 GM/177ML SOLN  •  pramipexole (MIRAPEX) 0 25 mg tablet  •  Pramipexole Dihydrochloride ER 0 375 MG TB24  •  rosuvastatin (CRESTOR) 10 MG tablet  •  spironolactone (ALDACTONE) 50 mg tablet  •  tolterodine (DETROL LA) 4 mg 24 hr capsule  •  venlafaxine (EFFEXOR-XR) 75 mg 24 hr capsule  •  ALPRAZolam (XANAX) 0 25 mg tablet  •  amLODIPine Besylate (NORVASC PO)  •  aspirin 81 MG tablet  •  azithromycin (AZASITE) 1 % ophthalmic solution  •  ciprofloxacin (CIPRO) 500 mg tablet  •  diphenhydrAMINE (BENADRYL) 25 mg capsule  •  Eliquis 5 MG  •  erythromycin (ILOTYCIN) ophthalmic ointment  •  esomeprazole (NexIUM) 40 MG capsule  •  Lancets (OneTouch Delica Plus [id number]) MISC  •  lisinopril (ZESTRIL) 5 mg tablet  •  olopatadine (PATANOL) 0 1 % ophthalmic solution  •  OneTouch Verio test strip  •  phenazopyridine (PYRIDIUM) 100 mg tablet  •  predniSONE 10 mg tablet  •  sulfacetamide-prednisolone (BLEPHAMIDE) 10-0 2 % ophthalmic suspension  •  tamoxifen (NOLVADEX) 20 mg tablet    Allergies   Allergen Reactions   • Celecoxib    • Neomycin-Bacitracin Zn-Polymyx Other (See Comments)     Irritated and pus discharge   • Rofecoxib    • Tape  [Medical Tape]    • Tetanus Antitoxin            Objective     Blood pressure 126/64, pulse 104, height 5' 6" (1 676 m), weight 98 4 kg (217 lb)  Body mass index is 35 02 kg/m²          PHYSICAL EXAM:      General Appearance:   Alert, cooperative, no distress   HEENT:   Normocephalic, atraumatic, anicteric      Neck:  Supple, symmetrical, trachea midline   Lungs:   Clear to auscultation bilaterally; no rales, rhonchi or wheezing; respirations unlabored    Heart[de-identified]   Regular rate and rhythm; no murmur, rub, or gallop  Abdomen:   Soft, non-tender, non-distended; normal bowel sounds; no masses, no organomegaly   Liver left lobe is enlarged and formed  Spleen is not palpable  No evidence of ascites   Genitalia:   Deferred    Rectal:   Deferred    Extremities:  No cyanosis, clubbing or edema    Pulses:  2+ and symmetric    Skin:  No jaundice, rashes, or lesions    Lymph nodes:  No palpable cervical lymphadenopathy        Lab Results:   No visits with results within 1 Day(s) from this visit     Latest known visit with results is:   Appointment on 03/23/2022   Component Date Value   • Sodium 03/23/2022 139    • Potassium 03/23/2022 3 7    • Chloride 03/23/2022 112 (A)   • CO2 03/23/2022 22    • ANION GAP 03/23/2022 5    • BUN 03/23/2022 8    • Creatinine 03/23/2022 0 86    • Glucose, Fasting 03/23/2022 135 (A)   • Calcium 03/23/2022 8 8    • Corrected Calcium 03/23/2022 9 3    • AST 03/23/2022 52 (A)   • ALT 03/23/2022 54    • Alkaline Phosphatase 03/23/2022 75    • Total Protein 03/23/2022 6 9    • Albumin 03/23/2022 3 4 (A)   • Total Bilirubin 03/23/2022 0 72    • eGFR 03/23/2022 69    • GGT 03/23/2022 45    • CA 27 29 03/23/2022 15 5    • WBC 03/23/2022 6 89    • RBC 03/23/2022 4 45    • Hemoglobin 03/23/2022 12 7    • Hematocrit 03/23/2022 38 8    • MCV 03/23/2022 87    • MCH 03/23/2022 28 5    • MCHC 03/23/2022 32 7    • RDW 03/23/2022 15 2 (A)   • MPV 03/23/2022 12 8 (A)   • Platelets 04/77/2601 112 (A)   • nRBC 03/23/2022 0    • Neutrophils Relative 03/23/2022 67    • Immat GRANS % 03/23/2022 0    • Lymphocytes Relative 03/23/2022 27    • Monocytes Relative 03/23/2022 4    • Eosinophils Relative 03/23/2022 1    • Basophils Relative 03/23/2022 1    • Neutrophils Absolute 03/23/2022 4 55    • Immature Grans Absolute 03/23/2022 0 02    • Lymphocytes Absolute 03/23/2022 1 89 • Monocytes Absolute 03/23/2022 0 29    • Eosinophils Absolute 03/23/2022 0 09    • Basophils Absolute 03/23/2022 0 05    • Urine Culture 03/23/2022 >100,000 cfu/ml Escherichia coli (A)         Radiology Results:   No results found

## 2022-12-08 ENCOUNTER — TELEPHONE (OUTPATIENT)
Dept: GASTROENTEROLOGY | Facility: CLINIC | Age: 72
End: 2022-12-08

## 2022-12-08 NOTE — TELEPHONE ENCOUNTER
Scheduled date of colonoscopy (as of today): 3/10/23    Physician performing colonoscopy: Dr Gianna Real    Location of colonoscopy: [facility]

## 2023-03-01 ENCOUNTER — TELEPHONE (OUTPATIENT)
Dept: GASTROENTEROLOGY | Facility: AMBULARY SURGERY CENTER | Age: 73
End: 2023-03-01

## 2023-03-01 ENCOUNTER — OFFICE VISIT (OUTPATIENT)
Dept: GASTROENTEROLOGY | Facility: AMBULARY SURGERY CENTER | Age: 73
End: 2023-03-01

## 2023-03-01 VITALS
SYSTOLIC BLOOD PRESSURE: 132 MMHG | HEART RATE: 84 BPM | WEIGHT: 218 LBS | OXYGEN SATURATION: 100 % | RESPIRATION RATE: 18 BRPM | DIASTOLIC BLOOD PRESSURE: 76 MMHG | HEIGHT: 66 IN | BODY MASS INDEX: 35.03 KG/M2

## 2023-03-01 DIAGNOSIS — K21.9 GASTROESOPHAGEAL REFLUX DISEASE, UNSPECIFIED WHETHER ESOPHAGITIS PRESENT: ICD-10-CM

## 2023-03-01 DIAGNOSIS — Z12.11 COLON CANCER SCREENING: Primary | ICD-10-CM

## 2023-03-01 DIAGNOSIS — K76.0 FATTY LIVER: ICD-10-CM

## 2023-03-01 RX ORDER — SODIUM PICOSULFATE, MAGNESIUM OXIDE, AND ANHYDROUS CITRIC ACID 10; 3.5; 12 MG/160ML; G/160ML; G/160ML
LIQUID ORAL
Qty: 320 ML | Refills: 0 | Status: SHIPPED | OUTPATIENT
Start: 2023-03-01 | End: 2023-03-10 | Stop reason: ALTCHOICE

## 2023-03-01 RX ORDER — METFORMIN HYDROCHLORIDE 500 MG/1
TABLET, EXTENDED RELEASE ORAL
COMMUNITY
Start: 2023-02-26

## 2023-03-01 NOTE — PATIENT INSTRUCTIONS
Scheduled date of EGD/colonoscopy (as of today):3/10/2023  Physician performing EGD/colonoscopy:DR PURVIS  Location of E[facility]colonoscopy[facility]  Desired bowel prep reviewed with patient:CLENPIQ OR MIRALAX/DULCOLAX PER DR FINEPV  Instructions reviewed with patient by:BENJAMIN ALCANTAR  Clearances:   [id number]

## 2023-03-01 NOTE — H&P (VIEW-ONLY)
[facility]  Progress Note Dayana Solomon 81 y o  female MRN: [id number]    Unit/Bed#:  Encounter: [id number]    Assessment/Plan:    1  Colon cancer screening-increased risk secondary to grandparent with colon cancer  No first-degree relatives  Would recommend scheduling increased rescreening colonoscopy at this time  Clenpiq sent to the pharmacy  Patient is scheduled for colonoscopy next week  Patient was explained about the risks and benefits of the procedure  Risks including but not limited to bleeding, infection, perforation were explained in detail  Eliquis will need to be held 2 days prior to the procedure, will get okay from PCP  2   History of GERD-has occasional symptoms, had chronic symptoms for many years and was on PPI for over 5 years, has not had an EGD in over 30 years  -Given chronicity of symptoms, may be reasonable to proceed with EGD at the same time as colonoscopy to assess for Pickett's esophagus  -If there is any evidence of Pickett's esophagus, patient will need to be on a PPI otherwise would recommend dietary modifications, weight loss, avoid eating late at night, following antireflux measures   -Continue to avoid NSAIDs, use acetaminophen when possible  3   Hepatic steatosis-mild elevation of AST, hepatomegaly on ultrasound   -Recommend healthy weight loss with at least 7 to 10% of body weight loss to reverse steatosis and fibrosis  -Recommend tight control of diabetes and hyperlipidemia   -Discussed with patient regarding the possibility of development of cirrhosis  -Check vitamin D levels annually   -Fib 4 ordered by Dr Fausto Arrington  Subjective: This is a 14-year-old female with history of A-fib, on Eliquis, history of breast cancer previously on tamoxifen, presents for colon cancer screening evaluation  Patient reports that one of her grandparents was diagnosed with colon cancer, no first-degree relatives with colon cancer    Patient has never had a colonoscopy herself  She denies any change in bowel habits, hematochezia, abdominal pain or unintentional weight loss  She also reports that she had significant symptoms of acid reflux in the past for which she took PPI for many years however symptoms have mostly been under control without PPI at this time  She does still occasionally get breakthrough symptoms Which are typically due to dietary indiscretions  Patient reports having had an EGD over 30 years ago  She has not had an EGD since then  Denies dysphagia, dyne aphasia, loss of appetite or early satiety  No melena or hematochezia  Patient also tells me that she has thrombocytopenia and recently underwent blood work by her PCP last week and was told that the platelet count was over 100  Patient underwent blood work which was notable for mild elevation of AST, albumin was normal, glucose elevated, platelets of 479, hemoglobin 12 7, A1c of 7 4  Right upper quadrant ultrasound notable for hepatomegaly and diffuse hepatic steatosis, no ductal dilatation noted  He is status post cholecystectomy  Objective:     Vitals: Blood pressure 132/76, pulse 84, resp  rate 18, height 5' 6" (1 676 m), weight 98 9 kg (218 lb), SpO2 100 %  ,Body mass index is 35 19 kg/m²  [unfilled]    Physical Exam:    GEN: wn/wd, NAD  HEENT: MMM,  anciteric  CV: Irregularly irregular,  CHEST: CTA b/l, no w/r/r  ABD: +BS, soft, NT/ND, mild hepatomegaly  EXT: no c/c/e  SKIN: no rashes  NEURO: aaox3      Invasive Devices     None                         Lab, Imaging and other studies:     No visits with results within 1 Day(s) from this visit     Latest known visit with results is:   Appointment on 03/23/2022   Component Date Value   • Sodium 03/23/2022 139    • Potassium 03/23/2022 3 7    • Chloride 03/23/2022 112 (H)    • CO2 03/23/2022 22    • ANION GAP 03/23/2022 5    • BUN 03/23/2022 8    • Creatinine 03/23/2022 0 86    • Glucose, Fasting 03/23/2022 135 (H) • Calcium 03/23/2022 8 8    • Corrected Calcium 03/23/2022 9 3    • AST 03/23/2022 52 (H)    • ALT 03/23/2022 54    • Alkaline Phosphatase 03/23/2022 75    • Total Protein 03/23/2022 6 9    • Albumin 03/23/2022 3 4 (L)    • Total Bilirubin 03/23/2022 0 72    • eGFR 03/23/2022 69    • GGT 03/23/2022 45    • CA 27 29 03/23/2022 15 5    • WBC 03/23/2022 6 89    • RBC 03/23/2022 4 45    • Hemoglobin 03/23/2022 12 7    • Hematocrit 03/23/2022 38 8    • MCV 03/23/2022 87    • MCH 03/23/2022 28 5    • MCHC 03/23/2022 32 7    • RDW 03/23/2022 15 2 (H)    • MPV 03/23/2022 12 8 (H)    • Platelets 70/84/5020 112 (L)    • nRBC 03/23/2022 0    • Neutrophils Relative 03/23/2022 67    • Immat GRANS % 03/23/2022 0    • Lymphocytes Relative 03/23/2022 27    • Monocytes Relative 03/23/2022 4    • Eosinophils Relative 03/23/2022 1    • Basophils Relative 03/23/2022 1    • Neutrophils Absolute 03/23/2022 4 55    • Immature Grans Absolute 03/23/2022 0 02    • Lymphocytes Absolute 03/23/2022 1 89    • Monocytes Absolute 03/23/2022 0 29    • Eosinophils Absolute 03/23/2022 0 09    • Basophils Absolute 03/23/2022 0 05    • Urine Culture 03/23/2022 >100,000 cfu/ml Escherichia coli (A)          I have personally reviewed pertinent films in PACS    No current facility-administered medications for this visit  Answers for HPI/ROS submitted by the patient on 2/22/2023  anorexia: No  arthralgias: Yes  belching: Yes  constipation: No  diarrhea: No  dysuria: No  fever: No  flatus: No  frequency: Yes  headaches: No  hematochezia: No  hematuria: No  melena: No  myalgias: No  nausea:  No  weight loss: No  vomiting: No

## 2023-03-01 NOTE — TELEPHONE ENCOUNTER
Our mutual patient is scheduled for procedure: EGD/COLONOSCOPY    On: _3___/_ 10   _/_ 2023   _      With:   ___HYUN______    He/She is taking the following blood thinner:   ELIQUIS       Can this be stopped ___2___ days prior to the procedure?       Physician Approving clearance: ________________________

## 2023-03-01 NOTE — PROGRESS NOTES
[facility]  Progress Note Tanna Baker 81 y o  female MRN: [id number]    Unit/Bed#:  Encounter: [id number]    Assessment/Plan:    1  Colon cancer screening-increased risk secondary to grandparent with colon cancer  No first-degree relatives  Would recommend scheduling increased rescreening colonoscopy at this time  Clenpiq sent to the pharmacy  Patient is scheduled for colonoscopy next week  Patient was explained about the risks and benefits of the procedure  Risks including but not limited to bleeding, infection, perforation were explained in detail  Eliquis will need to be held 2 days prior to the procedure, will get okay from PCP  2   History of GERD-has occasional symptoms, had chronic symptoms for many years and was on PPI for over 5 years, has not had an EGD in over 30 years  -Given chronicity of symptoms, may be reasonable to proceed with EGD at the same time as colonoscopy to assess for Pickett's esophagus  -If there is any evidence of Pickett's esophagus, patient will need to be on a PPI otherwise would recommend dietary modifications, weight loss, avoid eating late at night, following antireflux measures   -Continue to avoid NSAIDs, use acetaminophen when possible  3   Hepatic steatosis-mild elevation of AST, hepatomegaly on ultrasound   -Recommend healthy weight loss with at least 7 to 10% of body weight loss to reverse steatosis and fibrosis  -Recommend tight control of diabetes and hyperlipidemia   -Discussed with patient regarding the possibility of development of cirrhosis  -Check vitamin D levels annually   -Fib 4 ordered by Dr Ella French  Subjective: This is a 30-year-old female with history of A-fib, on Eliquis, history of breast cancer previously on tamoxifen, presents for colon cancer screening evaluation  Patient reports that one of her grandparents was diagnosed with colon cancer, no first-degree relatives with colon cancer    Patient has never had a colonoscopy herself  She denies any change in bowel habits, hematochezia, abdominal pain or unintentional weight loss  She also reports that she had significant symptoms of acid reflux in the past for which she took PPI for many years however symptoms have mostly been under control without PPI at this time  She does still occasionally get breakthrough symptoms Which are typically due to dietary indiscretions  Patient reports having had an EGD over 30 years ago  She has not had an EGD since then  Denies dysphagia, dyne aphasia, loss of appetite or early satiety  No melena or hematochezia  Patient also tells me that she has thrombocytopenia and recently underwent blood work by her PCP last week and was told that the platelet count was over 100  Patient underwent blood work which was notable for mild elevation of AST, albumin was normal, glucose elevated, platelets of 491, hemoglobin 12 7, A1c of 7 4  Right upper quadrant ultrasound notable for hepatomegaly and diffuse hepatic steatosis, no ductal dilatation noted  He is status post cholecystectomy  Objective:     Vitals: Blood pressure 132/76, pulse 84, resp  rate 18, height 5' 6" (1 676 m), weight 98 9 kg (218 lb), SpO2 100 %  ,Body mass index is 35 19 kg/m²  [unfilled]    Physical Exam:    GEN: wn/wd, NAD  HEENT: MMM,  anciteric  CV: Irregularly irregular,  CHEST: CTA b/l, no w/r/r  ABD: +BS, soft, NT/ND, mild hepatomegaly  EXT: no c/c/e  SKIN: no rashes  NEURO: aaox3      Invasive Devices     None                         Lab, Imaging and other studies:     No visits with results within 1 Day(s) from this visit     Latest known visit with results is:   Appointment on 03/23/2022   Component Date Value   • Sodium 03/23/2022 139    • Potassium 03/23/2022 3 7    • Chloride 03/23/2022 112 (H)    • CO2 03/23/2022 22    • ANION GAP 03/23/2022 5    • BUN 03/23/2022 8    • Creatinine 03/23/2022 0 86    • Glucose, Fasting 03/23/2022 135 (H) • Calcium 03/23/2022 8 8    • Corrected Calcium 03/23/2022 9 3    • AST 03/23/2022 52 (H)    • ALT 03/23/2022 54    • Alkaline Phosphatase 03/23/2022 75    • Total Protein 03/23/2022 6 9    • Albumin 03/23/2022 3 4 (L)    • Total Bilirubin 03/23/2022 0 72    • eGFR 03/23/2022 69    • GGT 03/23/2022 45    • CA 27 29 03/23/2022 15 5    • WBC 03/23/2022 6 89    • RBC 03/23/2022 4 45    • Hemoglobin 03/23/2022 12 7    • Hematocrit 03/23/2022 38 8    • MCV 03/23/2022 87    • MCH 03/23/2022 28 5    • MCHC 03/23/2022 32 7    • RDW 03/23/2022 15 2 (H)    • MPV 03/23/2022 12 8 (H)    • Platelets 30/26/2527 112 (L)    • nRBC 03/23/2022 0    • Neutrophils Relative 03/23/2022 67    • Immat GRANS % 03/23/2022 0    • Lymphocytes Relative 03/23/2022 27    • Monocytes Relative 03/23/2022 4    • Eosinophils Relative 03/23/2022 1    • Basophils Relative 03/23/2022 1    • Neutrophils Absolute 03/23/2022 4 55    • Immature Grans Absolute 03/23/2022 0 02    • Lymphocytes Absolute 03/23/2022 1 89    • Monocytes Absolute 03/23/2022 0 29    • Eosinophils Absolute 03/23/2022 0 09    • Basophils Absolute 03/23/2022 0 05    • Urine Culture 03/23/2022 >100,000 cfu/ml Escherichia coli (A)          I have personally reviewed pertinent films in PACS    No current facility-administered medications for this visit  Answers for HPI/ROS submitted by the patient on 2/22/2023  anorexia: No  arthralgias: Yes  belching: Yes  constipation: No  diarrhea: No  dysuria: No  fever: No  flatus: No  frequency: Yes  headaches: No  hematochezia: No  hematuria: No  melena: No  myalgias: No  nausea:  No  weight loss: No  vomiting: No

## 2023-03-01 NOTE — LETTER
March 1, 2023     Nai Bains MD  [address]  [place] [address]    Patient: Krysta Rowe   YOB: 1950   Date of Visit: 3/1/2023       Dear Dr Mague Rose: Thank you for referring Krysta Rowe to me for evaluation  Below are my notes for this consultation  If you have questions, please do not hesitate to call me  I look forward to following your patient along with you  Sincerely,        Mani Zepeda MD        CC: MD Mani Higuera MD  3/1/2023  1:48 PM  Sign when Signing Visit  [facility]  Progress Note - Krysta Rowe 81 y o  female MRN: [id number]    Unit/Bed#:  Encounter: [id number]    Assessment/Plan:    1  Colon cancer screening-increased risk secondary to grandparent with colon cancer  No first-degree relatives  Would recommend scheduling increased rescreening colonoscopy at this time  Clenpiq sent to the pharmacy  Patient is scheduled for colonoscopy next week  Patient was explained about the risks and benefits of the procedure  Risks including but not limited to bleeding, infection, perforation were explained in detail  Eliquis will need to be held 2 days prior to the procedure, will get okay from PCP  2   History of GERD-has occasional symptoms, had chronic symptoms for many years and was on PPI for over 5 years, has not had an EGD in over 30 years  -Given chronicity of symptoms, may be reasonable to proceed with EGD at the same time as colonoscopy to assess for Pickett's esophagus  -If there is any evidence of Pickett's esophagus, patient will need to be on a PPI otherwise would recommend dietary modifications, weight loss, avoid eating late at night, following antireflux measures   -Continue to avoid NSAIDs, use acetaminophen when possible      3   Hepatic steatosis-mild elevation of AST, hepatomegaly on ultrasound   -Recommend healthy weight loss with at least 7 to 10% of body weight loss to reverse steatosis and fibrosis  -Recommend tight control of diabetes and hyperlipidemia   -Discussed with patient regarding the possibility of development of cirrhosis  -Check vitamin D levels annually   -Fib 4 ordered by Dr Samuel Libman  Subjective: This is a 43-year-old female with history of A-fib, on Eliquis, history of breast cancer previously on tamoxifen, presents for colon cancer screening evaluation  Patient reports that one of her grandparents was diagnosed with colon cancer, no first-degree relatives with colon cancer  Patient has never had a colonoscopy herself  She denies any change in bowel habits, hematochezia, abdominal pain or unintentional weight loss  She also reports that she had significant symptoms of acid reflux in the past for which she took PPI for many years however symptoms have mostly been under control without PPI at this time  She does still occasionally get breakthrough symptoms Which are typically due to dietary indiscretions  Patient reports having had an EGD over 30 years ago  She has not had an EGD since then  Denies dysphagia, dyne aphasia, loss of appetite or early satiety  No melena or hematochezia  Patient also tells me that she has thrombocytopenia and recently underwent blood work by her PCP last week and was told that the platelet count was over 100  Patient underwent blood work which was notable for mild elevation of AST, albumin was normal, glucose elevated, platelets of 365, hemoglobin 12 7, A1c of 7 4  Right upper quadrant ultrasound notable for hepatomegaly and diffuse hepatic steatosis, no ductal dilatation noted  He is status post cholecystectomy  Objective:     Vitals: Blood pressure 132/76, pulse 84, resp  rate 18, height 5' 6" (1 676 m), weight 98 9 kg (218 lb), SpO2 100 %  ,Body mass index is 35 19 kg/m²      [unfilled]    Physical Exam:    GEN: wn/wd, NAD  HEENT: MMM,  anciteric  CV: Irregularly irregular,  CHEST: CTA b/l, no w/r/r  ABD: +BS, soft, NT/ND, mild hepatomegaly  EXT: no c/c/e  SKIN: no rashes  NEURO: aaox3      Invasive Devices     None                         Lab, Imaging and other studies:     No visits with results within 1 Day(s) from this visit  Latest known visit with results is:   Appointment on 03/23/2022   Component Date Value   • Sodium 03/23/2022 139    • Potassium 03/23/2022 3 7    • Chloride 03/23/2022 112 (H)    • CO2 03/23/2022 22    • ANION GAP 03/23/2022 5    • BUN 03/23/2022 8    • Creatinine 03/23/2022 0 86    • Glucose, Fasting 03/23/2022 135 (H)    • Calcium 03/23/2022 8 8    • Corrected Calcium 03/23/2022 9 3    • AST 03/23/2022 52 (H)    • ALT 03/23/2022 54    • Alkaline Phosphatase 03/23/2022 75    • Total Protein 03/23/2022 6 9    • Albumin 03/23/2022 3 4 (L)    • Total Bilirubin 03/23/2022 0 72    • eGFR 03/23/2022 69    • GGT 03/23/2022 45    • CA 27 29 03/23/2022 15 5    • WBC 03/23/2022 6 89    • RBC 03/23/2022 4 45    • Hemoglobin 03/23/2022 12 7    • Hematocrit 03/23/2022 38 8    • MCV 03/23/2022 87    • MCH 03/23/2022 28 5    • MCHC 03/23/2022 32 7    • RDW 03/23/2022 15 2 (H)    • MPV 03/23/2022 12 8 (H)    • Platelets 86/36/5718 112 (L)    • nRBC 03/23/2022 0    • Neutrophils Relative 03/23/2022 67    • Immat GRANS % 03/23/2022 0    • Lymphocytes Relative 03/23/2022 27    • Monocytes Relative 03/23/2022 4    • Eosinophils Relative 03/23/2022 1    • Basophils Relative 03/23/2022 1    • Neutrophils Absolute 03/23/2022 4 55    • Immature Grans Absolute 03/23/2022 0 02    • Lymphocytes Absolute 03/23/2022 1 89    • Monocytes Absolute 03/23/2022 0 29    • Eosinophils Absolute 03/23/2022 0 09    • Basophils Absolute 03/23/2022 0 05    • Urine Culture 03/23/2022 >100,000 cfu/ml Escherichia coli (A)          I have personally reviewed pertinent films in PACS    No current facility-administered medications for this visit               Answers for HPI/ROS submitted by the patient on 2/22/2023  anorexia: No  arthralgias: Yes  belching: Yes  constipation: No  diarrhea: No  dysuria: No  fever: No  flatus: No  frequency: Yes  headaches: No  hematochezia: No  hematuria: No  melena: No  myalgias: No  nausea:  No  weight loss: No  vomiting: No

## 2023-03-06 ENCOUNTER — TELEPHONE (OUTPATIENT)
Dept: GASTROENTEROLOGY | Facility: AMBULARY SURGERY CENTER | Age: 73
End: 2023-03-06

## 2023-03-06 NOTE — TELEPHONE ENCOUNTER
LVM for pt to confirm blood thinner clearance from Dr Obdulio Mendoza is scheduled for colonoscopy/egd w/ Dr Burke Perez 3/10/2023

## 2023-03-10 ENCOUNTER — ANESTHESIA EVENT (OUTPATIENT)
Dept: GASTROENTEROLOGY | Facility: AMBULARY SURGERY CENTER | Age: 73
End: 2023-03-10

## 2023-03-10 ENCOUNTER — HOSPITAL ENCOUNTER (OUTPATIENT)
Dept: GASTROENTEROLOGY | Facility: AMBULARY SURGERY CENTER | Age: 73
Setting detail: OUTPATIENT SURGERY
End: 2023-03-10

## 2023-03-10 ENCOUNTER — ANESTHESIA (OUTPATIENT)
Dept: GASTROENTEROLOGY | Facility: AMBULARY SURGERY CENTER | Age: 73
End: 2023-03-10

## 2023-03-10 VITALS
RESPIRATION RATE: 20 BRPM | OXYGEN SATURATION: 98 % | SYSTOLIC BLOOD PRESSURE: 142 MMHG | TEMPERATURE: 97.4 F | WEIGHT: 209 LBS | DIASTOLIC BLOOD PRESSURE: 73 MMHG | BODY MASS INDEX: 33.59 KG/M2 | HEART RATE: 78 BPM | HEIGHT: 66 IN

## 2023-03-10 DIAGNOSIS — Z12.11 ENCOUNTER FOR SCREENING COLONOSCOPY: ICD-10-CM

## 2023-03-10 DIAGNOSIS — K21.9 GASTROESOPHAGEAL REFLUX DISEASE WITHOUT ESOPHAGITIS: ICD-10-CM

## 2023-03-10 PROBLEM — E66.9 CLASS 2 OBESITY IN ADULT: Status: ACTIVE | Noted: 2023-03-10

## 2023-03-10 PROBLEM — E66.812 CLASS 2 OBESITY IN ADULT: Status: ACTIVE | Noted: 2023-03-10

## 2023-03-10 RX ORDER — PROPOFOL 10 MG/ML
INJECTION, EMULSION INTRAVENOUS AS NEEDED
Status: DISCONTINUED | OUTPATIENT
Start: 2023-03-10 | End: 2023-03-10

## 2023-03-10 RX ORDER — SODIUM CHLORIDE, SODIUM LACTATE, POTASSIUM CHLORIDE, CALCIUM CHLORIDE 600; 310; 30; 20 MG/100ML; MG/100ML; MG/100ML; MG/100ML
INJECTION, SOLUTION INTRAVENOUS CONTINUOUS PRN
Status: DISCONTINUED | OUTPATIENT
Start: 2023-03-10 | End: 2023-03-10

## 2023-03-10 RX ORDER — LIDOCAINE HYDROCHLORIDE 20 MG/ML
INJECTION, SOLUTION EPIDURAL; INFILTRATION; INTRACAUDAL; PERINEURAL AS NEEDED
Status: DISCONTINUED | OUTPATIENT
Start: 2023-03-10 | End: 2023-03-10

## 2023-03-10 RX ADMIN — PROPOFOL 40 MG: 10 INJECTION, EMULSION INTRAVENOUS at 08:16

## 2023-03-10 RX ADMIN — SODIUM CHLORIDE, SODIUM LACTATE, POTASSIUM CHLORIDE, AND CALCIUM CHLORIDE: .6; .31; .03; .02 INJECTION, SOLUTION INTRAVENOUS at 08:00

## 2023-03-10 RX ADMIN — PROPOFOL 40 MG: 10 INJECTION, EMULSION INTRAVENOUS at 08:49

## 2023-03-10 RX ADMIN — PROPOFOL 50 MG: 10 INJECTION, EMULSION INTRAVENOUS at 08:14

## 2023-03-10 RX ADMIN — LIDOCAINE HYDROCHLORIDE 100 MG: 20 INJECTION, SOLUTION EPIDURAL; INFILTRATION; INTRACAUDAL at 08:12

## 2023-03-10 RX ADMIN — PROPOFOL 50 MG: 10 INJECTION, EMULSION INTRAVENOUS at 08:41

## 2023-03-10 RX ADMIN — PROPOFOL 50 MG: 10 INJECTION, EMULSION INTRAVENOUS at 08:24

## 2023-03-10 RX ADMIN — PROPOFOL 50 MG: 10 INJECTION, EMULSION INTRAVENOUS at 08:25

## 2023-03-10 RX ADMIN — PROPOFOL 40 MG: 10 INJECTION, EMULSION INTRAVENOUS at 08:46

## 2023-03-10 RX ADMIN — PROPOFOL 20 MG: 10 INJECTION, EMULSION INTRAVENOUS at 08:54

## 2023-03-10 RX ADMIN — PROPOFOL 50 MG: 10 INJECTION, EMULSION INTRAVENOUS at 08:30

## 2023-03-10 RX ADMIN — PROPOFOL 30 MG: 10 INJECTION, EMULSION INTRAVENOUS at 08:13

## 2023-03-10 RX ADMIN — PROPOFOL 120 MG: 10 INJECTION, EMULSION INTRAVENOUS at 08:12

## 2023-03-10 RX ADMIN — PROPOFOL 50 MG: 10 INJECTION, EMULSION INTRAVENOUS at 08:36

## 2023-03-10 RX ADMIN — PROPOFOL 20 MG: 10 INJECTION, EMULSION INTRAVENOUS at 08:20

## 2023-03-10 RX ADMIN — PROPOFOL 40 MG: 10 INJECTION, EMULSION INTRAVENOUS at 08:15

## 2023-03-10 RX ADMIN — PROPOFOL 50 MG: 10 INJECTION, EMULSION INTRAVENOUS at 08:22

## 2023-03-10 NOTE — ANESTHESIA PREPROCEDURE EVALUATION
Procedure:  COLONOSCOPY  EGD    Relevant Problems   ANESTHESIA (within normal limits)      CARDIO (within normal limits)      ENDO (within normal limits)      GI/HEPATIC   (+) Fatty liver   (+) Gastroesophageal reflux disease      PULMONARY (within normal limits)      Other   (+) Class 2 obesity in adult        Physical Exam    Airway    Mallampati score: III  TM Distance: >3 FB  Neck ROM: full     Dental   No notable dental hx     Cardiovascular      Pulmonary      Other Findings        Anesthesia Plan  ASA Score- 2     Anesthesia Type- IV sedation with anesthesia with ASA Monitors  Additional Monitors:   Airway Plan:           Plan Factors-Exercise tolerance (METS): >4 METS  Chart reviewed  Existing labs reviewed  Patient summary reviewed  Patient is not a current smoker  Induction-     Postoperative Plan-     Informed Consent- Anesthetic plan and risks discussed with patient  I personally reviewed this patient with the CRNA  Discussed and agreed on the Anesthesia Plan with the CRNA  Iman Patel

## 2023-03-10 NOTE — INTERVAL H&P NOTE
H&P reviewed  After examining the patient I find no changes in the patients condition since the H&P had been written      Vitals:    03/10/23 0747   BP: 138/64   Pulse: 74   Resp: 19   Temp: 97 6 °F (36 4 °C)   SpO2: 98%

## 2023-03-16 ENCOUNTER — TELEPHONE (OUTPATIENT)
Dept: OTHER | Facility: OTHER | Age: 73
End: 2023-03-16

## 2023-03-17 NOTE — TELEPHONE ENCOUNTER
Spoke with patient, informed her Dr Sandro Valdez try to call her yesterday to review biopsy results with her  [facility] and had [facility] questions

## 2023-04-30 PROBLEM — Z12.11 COLON CANCER SCREENING: Status: RESOLVED | Noted: 2023-03-01 | Resolved: 2023-04-30

## 2023-07-23 ENCOUNTER — OFFICE VISIT (OUTPATIENT)
Dept: URGENT CARE | Facility: CLINIC | Age: 73
End: 2023-07-23
Payer: COMMERCIAL

## 2023-07-23 VITALS — OXYGEN SATURATION: 94 % | HEART RATE: 118 BPM | RESPIRATION RATE: 20 BRPM | TEMPERATURE: 97.4 F

## 2023-07-23 DIAGNOSIS — L03.116 CELLULITIS OF LEFT LOWER EXTREMITY: ICD-10-CM

## 2023-07-23 DIAGNOSIS — J06.9 VIRAL URI: Primary | ICD-10-CM

## 2023-07-23 PROCEDURE — 99213 OFFICE O/P EST LOW 20 MIN: CPT | Performed by: STUDENT IN AN ORGANIZED HEALTH CARE EDUCATION/TRAINING PROGRAM

## 2023-07-23 RX ORDER — CEPHALEXIN 500 MG/1
500 CAPSULE ORAL EVERY 12 HOURS SCHEDULED
Qty: 14 CAPSULE | Refills: 0 | Status: SHIPPED | OUTPATIENT
Start: 2023-07-23 | End: 2023-07-30

## 2023-07-23 RX ORDER — FLUTICASONE PROPIONATE 50 MCG
1 SPRAY, SUSPENSION (ML) NASAL 2 TIMES DAILY
Qty: 11.1 ML | Refills: 0 | Status: SHIPPED | OUTPATIENT
Start: 2023-07-23

## 2023-07-23 NOTE — PROGRESS NOTES
[facility]        NAME: Jose J Clifton is a 81 y.o. female  : 1950    MRN: [id number]  DATE: 2023  TIME: 3:52 PM    Assessment and Orders   Viral URI [J06.9]  1. Viral URI  fluticasone (FLONASE) 50 mcg/act nasal spray      2. Cellulitis of left lower extremity  cephalexin (KEFLEX) 500 mg capsule            Plan and Discussion      Patient presenting with symptoms of viral upper respiratory infection. Will treat with fluticasone to help with symptoms. Discussed other supportive care. Patient also has erythema on her left lower leg around multiple insect bites. Patient does have history of diabetes so we will treat with Keflex. Discussed ED precautions including (but not limited to)  • Difficultly breathing or shortness of breath  • Chest pain  • Acutely worsening symptoms. Risks and benefits discussed. Patient understands and agrees with the plan. Follow up with PCP. Chief Complaint     Chief Complaint   Patient presents with   • Cold Like Symptoms     Achy, congestion tired, sore throat couple days   • Insect Bite     States she has a bite from vacation on her L lower leg a couple days ago. History of Present Illness       Insect Bite  This is a new problem. The current episode started in the past 7 days. The problem occurs constantly. The problem has been gradually worsening. Associated symptoms include chills, congestion, a fever, headaches, myalgias, a rash, a sore throat and swollen glands. Sinusitis  This is a new problem. The current episode started in the past 7 days. The problem has been gradually worsening since onset. The maximum temperature recorded prior to her arrival was 100.4 - 100.9 F. Associated symptoms include chills, congestion, headaches, sinus pressure, a sore throat and swollen glands. Past treatments include nothing. Review of Systems   Review of Systems   Constitutional: Positive for chills and fever.    HENT: Positive for congestion, sinus pressure and sore throat. Musculoskeletal: Positive for myalgias. Skin: Positive for rash. Neurological: Positive for headaches. Current Medications       Current Outpatient Medications:   •  cephalexin (KEFLEX) 500 mg capsule, Take 1 capsule (500 mg total) by mouth every 12 (twelve) hours for 7 days, Disp: 14 capsule, Rfl: 0  •  Cholecalciferol 25 MCG (1000 UT) tablet, Take 1,000 Units by mouth daily, Disp: , Rfl:   •  digoxin (LANOXIN) 0.25 mg tablet, TAKE 1 TABLET BY MOUTH EVERY DAY, Disp: , Rfl:   •  Eliquis 5 MG, Take 5 mg by mouth 2 (two) times a day, Disp: , Rfl:   •  fluticasone (FLONASE) 50 mcg/act nasal spray, 1 spray into each nostril 2 (two) times a day, Disp: 11.1 mL, Rfl: 0  •  Lancets (OneTouch Delica Plus [id number]) MISC, USE TO TEST ONCE DAILY, Disp: , Rfl:   •  lisinopril (ZESTRIL) 5 mg tablet, Take 5 mg by mouth daily, Disp: , Rfl:   •  loratadine (CLARITIN) 10 mg tablet, Take 1 tablet (10 mg total) by mouth daily, Disp: 30 tablet, Rfl: 0  •  metFORMIN (GLUCOPHAGE-XR) 500 mg 24 hr tablet, TAKE 2 TABLETS BY MOUTH ONCE DAILY.  MUST MAKE AN APPT FOR FURTHER REFILLS', Disp: , Rfl:   •  OneTouch Verio test strip, USE TO TEST ONCE DAILY, Disp: , Rfl:   •  Pramipexole Dihydrochloride ER 0.375 MG TB24, TAKE 1 TABLET BY MOUTH EVERY DAY, Disp: , Rfl:   •  rosuvastatin (CRESTOR) 10 MG tablet, Take by mouth, Disp: , Rfl:   •  spironolactone (ALDACTONE) 50 mg tablet, Take by mouth, Disp: , Rfl:   •  tolterodine (DETROL LA) 4 mg 24 hr capsule, Take 4 mg by mouth daily, Disp: , Rfl:   •  venlafaxine (EFFEXOR-XR) 75 mg 24 hr capsule, Take 75 mg by mouth daily, Disp: , Rfl:   •  ALPRAZolam (XANAX) 0.25 mg tablet, , Disp: , Rfl:   •  amLODIPine Besylate (NORVASC PO), Take by mouth, Disp: , Rfl:   •  aspirin 81 MG tablet, Take by mouth, Disp: , Rfl:   •  azithromycin (AZASITE) 1 % ophthalmic solution, Apply 1 ggt in eye(s) bid x2 days, then qd x5 days, Disp: 2.5 mL, Rfl: 0  • ciprofloxacin (CIPRO) 500 mg tablet, , Disp: , Rfl:   •  diphenhydrAMINE (BENADRYL) 25 mg capsule, Take 25 mg by mouth every 6 (six) hours as needed, Disp: , Rfl:   •  Docusate Sodium (DSS) 100 MG CAPS, Take 100 mg by mouth 2 (two) times a day (Patient not taking: Reported on 7/23/2023), Disp: , Rfl:   •  erythromycin (ILOTYCIN) ophthalmic ointment, Administer 0.5 inches to both eyes every 4 (four) hours, Disp: 3.5 g, Rfl: 0  •  esomeprazole (NexIUM) 40 MG capsule, Take by mouth (Patient not taking: Reported on 3/29/2022), Disp: , Rfl:   •  olopatadine (PATANOL) 0.1 % ophthalmic solution, Administer 1 drop to both eyes 2 (two) times a day, Disp: 5 mL, Rfl: 0  •  phenazopyridine (PYRIDIUM) 100 mg tablet, Take 1 tablet (100 mg total) by mouth 3 (three) times a day as needed for bladder spasms, Disp: 10 tablet, Rfl: 0  •  pramipexole (MIRAPEX) 0.25 mg tablet, Take by mouth, Disp: , Rfl:   •  predniSONE 10 mg tablet, TAKE 4 TAB PO DAILY X 3 DAYS, THEN 3 TAB DAILY X 2 DAYS, THEN 2 TAB DAILY X 2 DAYS, THEN 1 TAB DAILY X 2 DAYS, WITH FOOD (Patient not taking: No sig reported), Disp: 24 tablet, Rfl: 0  •  sulfacetamide-prednisolone (BLEPHAMIDE) 10-0.2 % ophthalmic suspension, Administer 1 drop to both eyes every 3 (three) hours (Patient not taking: No sig reported), Disp: 10 mL, Rfl: 0  •  tamoxifen (NOLVADEX) 20 mg tablet, TAIKE 1 TABLET BY MOUTH DAILY (Patient not taking: Reported on 10/27/2022), Disp: , Rfl:     Current Allergies     Allergies as of 07/23/2023 - Reviewed 07/23/2023   Allergen Reaction Noted   • Celecoxib  09/19/2016   • Neomycin-bacitracin zn-polymyx Other (See Comments) 09/18/2014   • Rofecoxib  09/18/2014   • Tape  [medical tape]  09/18/2014   • Tetanus antitoxin  09/18/2014            The following portions of the patient's history were reviewed and updated as appropriate: allergies, current medications, past family history, past medical history, past social history, past surgical history and problem list.     No past medical history on file. Past Surgical History:   Procedure Laterality Date   • CHOLECYSTECTOMY     • MAMMO NEEDLE LOCALIZATION RIGHT (ALL INC) Right 02/27/2015   • MASTECTOMY Right 2016       Family History   Problem Relation Age of Onset   • Arthritis Mother    • Heart disease Father    • Colon cancer Maternal Grandfather          Medications have been verified. Objective   Pulse (!) 118   Temp (!) 97.4 °F (36.3 °C) (Temporal)   Resp 20   SpO2 94%   No LMP recorded. Patient is postmenopausal.       Physical Exam     Physical Exam  Constitutional:       General: She is not in acute distress. Appearance: She is not ill-appearing or toxic-appearing. HENT:      Head: Normocephalic and atraumatic. Right Ear: Tympanic membrane and external ear normal.      Left Ear: Tympanic membrane and external ear normal.      Nose: Congestion present. Comments: Boggy nasal turbinates     Mouth/Throat:      Pharynx: Posterior oropharyngeal erythema present. Comments: Cobblestone appearance in posterior pharynx  Cardiovascular:      Rate and Rhythm: Normal rate and regular rhythm. Pulmonary:      Effort: Pulmonary effort is normal. No respiratory distress. Breath sounds: No wheezing. Skin:            Comments: Multiple open wounds secondary to insect bites. Surrounding erythema and signs of excoriations. Neurological:      General: No focal deficit present. Mental Status: She is alert and oriented to person, place, and time. Psychiatric:         Mood and Affect: Mood normal.         Behavior: Behavior normal.         Thought Content:  Thought content normal.         Judgment: Judgment normal.               Paresh Felipe DO

## 2023-08-09 ENCOUNTER — APPOINTMENT (OUTPATIENT)
Dept: LAB | Facility: CLINIC | Age: 73
End: 2023-08-09
Payer: COMMERCIAL

## 2023-08-09 ENCOUNTER — TRANSCRIBE ORDERS (OUTPATIENT)
Dept: LAB | Facility: CLINIC | Age: 73
End: 2023-08-09

## 2023-08-09 DIAGNOSIS — N39.0 URINARY TRACT INFECTION WITHOUT HEMATURIA, SITE UNSPECIFIED: ICD-10-CM

## 2023-08-09 DIAGNOSIS — N39.0 URINARY TRACT INFECTION WITHOUT HEMATURIA, SITE UNSPECIFIED: Primary | ICD-10-CM

## 2023-08-09 LAB
BACTERIA UR QL AUTO: ABNORMAL /HPF
BILIRUB UR QL STRIP: NEGATIVE
CLARITY UR: CLEAR
COLOR UR: YELLOW
GLUCOSE UR STRIP-MCNC: NEGATIVE MG/DL
HGB UR QL STRIP.AUTO: ABNORMAL
KETONES UR STRIP-MCNC: NEGATIVE MG/DL
LEUKOCYTE ESTERASE UR QL STRIP: ABNORMAL
MUCOUS THREADS UR QL AUTO: ABNORMAL
NITRITE UR QL STRIP: NEGATIVE
NON-SQ EPI CELLS URNS QL MICRO: ABNORMAL /HPF
PH UR STRIP.AUTO: 5.5 [PH]
PROT UR STRIP-MCNC: ABNORMAL MG/DL
RBC #/AREA URNS AUTO: ABNORMAL /HPF
SP GR UR STRIP.AUTO: 1.02 (ref 1–1.03)
UROBILINOGEN UR STRIP-ACNC: <2 MG/DL
WBC #/AREA URNS AUTO: ABNORMAL /HPF

## 2023-08-09 PROCEDURE — 87086 URINE CULTURE/COLONY COUNT: CPT

## 2023-08-09 PROCEDURE — 81001 URINALYSIS AUTO W/SCOPE: CPT

## 2023-08-10 LAB — BACTERIA UR CULT: NORMAL

## 2023-09-18 ENCOUNTER — TRANSCRIBE ORDERS (OUTPATIENT)
Dept: LAB | Facility: CLINIC | Age: 73
End: 2023-09-18

## 2023-09-18 ENCOUNTER — APPOINTMENT (OUTPATIENT)
Dept: LAB | Facility: CLINIC | Age: 73
End: 2023-09-18
Payer: COMMERCIAL

## 2023-09-18 DIAGNOSIS — C44.319 BASAL CELL CARCINOMA OF FOREHEAD: ICD-10-CM

## 2023-09-18 DIAGNOSIS — S90.414S: Primary | ICD-10-CM

## 2023-09-18 DIAGNOSIS — I48.91 ATRIAL FIBRILLATION, UNSPECIFIED TYPE (HCC): ICD-10-CM

## 2023-09-18 DIAGNOSIS — Z92.23 HISTORY OF ESTROGEN THERAPY: ICD-10-CM

## 2023-09-18 DIAGNOSIS — E13.69 OTHER SPECIFIED DIABETES MELLITUS WITH OTHER SPECIFIED COMPLICATION, UNSPECIFIED WHETHER LONG TERM INSULIN USE (HCC): ICD-10-CM

## 2023-09-18 DIAGNOSIS — D51.3 VEGANS' ANEMIA: ICD-10-CM

## 2023-09-18 DIAGNOSIS — C50.511 MALIGNANT NEOPLASM OF LOWER-OUTER QUADRANT OF RIGHT FEMALE BREAST, UNSPECIFIED ESTROGEN RECEPTOR STATUS (HCC): ICD-10-CM

## 2023-09-18 DIAGNOSIS — Z90.11 STATUS POST RIGHT MASTECTOMY: ICD-10-CM

## 2023-09-18 DIAGNOSIS — S90.414S: ICD-10-CM

## 2023-09-18 DIAGNOSIS — Z17.0 ESTROGEN RECEPTOR POSITIVE: ICD-10-CM

## 2023-09-18 DIAGNOSIS — I10 ESSENTIAL HYPERTENSION, MALIGNANT: ICD-10-CM

## 2023-09-18 LAB
ALBUMIN SERPL BCP-MCNC: 4.3 G/DL (ref 3.5–5)
ALP SERPL-CCNC: 54 U/L (ref 34–104)
ALT SERPL W P-5'-P-CCNC: 35 U/L (ref 7–52)
ANION GAP SERPL CALCULATED.3IONS-SCNC: 10 MMOL/L
AST SERPL W P-5'-P-CCNC: 31 U/L (ref 13–39)
BASOPHILS # BLD AUTO: 0.07 THOUSANDS/ÂΜL (ref 0–0.1)
BASOPHILS NFR BLD AUTO: 1 % (ref 0–1)
BILIRUB SERPL-MCNC: 0.64 MG/DL (ref 0.2–1)
BUN SERPL-MCNC: 16 MG/DL (ref 5–25)
CALCIUM SERPL-MCNC: 9.3 MG/DL (ref 8.4–10.2)
CHLORIDE SERPL-SCNC: 105 MMOL/L (ref 96–108)
CHOLEST SERPL-MCNC: 115 MG/DL
CO2 SERPL-SCNC: 24 MMOL/L (ref 21–32)
CREAT SERPL-MCNC: 0.86 MG/DL (ref 0.6–1.3)
DIGOXIN SERPL-MCNC: 0.7 NG/ML (ref 0.8–2)
EOSINOPHIL # BLD AUTO: 0.14 THOUSAND/ÂΜL (ref 0–0.61)
EOSINOPHIL NFR BLD AUTO: 2 % (ref 0–6)
ERYTHROCYTE [DISTWIDTH] IN BLOOD BY AUTOMATED COUNT: 15.1 % (ref 11.6–15.1)
EST. AVERAGE GLUCOSE BLD GHB EST-MCNC: 143 MG/DL
GFR SERPL CREATININE-BSD FRML MDRD: 68 ML/MIN/1.73SQ M
GGT SERPL-CCNC: 39 U/L (ref 9–64)
GLUCOSE P FAST SERPL-MCNC: 118 MG/DL (ref 65–99)
HBA1C MFR BLD: 6.6 %
HCT VFR BLD AUTO: 43.4 % (ref 34.8–46.1)
HDLC SERPL-MCNC: 39 MG/DL
HGB BLD-MCNC: 13.5 G/DL (ref 11.5–15.4)
IMM GRANULOCYTES # BLD AUTO: 0.03 THOUSAND/UL (ref 0–0.2)
IMM GRANULOCYTES NFR BLD AUTO: 0 % (ref 0–2)
LDLC SERPL CALC-MCNC: 59 MG/DL (ref 0–100)
LYMPHOCYTES # BLD AUTO: 2.26 THOUSANDS/ÂΜL (ref 0.6–4.47)
LYMPHOCYTES NFR BLD AUTO: 32 % (ref 14–44)
MCH RBC QN AUTO: 27.2 PG (ref 26.8–34.3)
MCHC RBC AUTO-ENTMCNC: 31.1 G/DL (ref 31.4–37.4)
MCV RBC AUTO: 87 FL (ref 82–98)
MONOCYTES # BLD AUTO: 0.37 THOUSAND/ÂΜL (ref 0.17–1.22)
MONOCYTES NFR BLD AUTO: 5 % (ref 4–12)
NEUTROPHILS # BLD AUTO: 4.23 THOUSANDS/ÂΜL (ref 1.85–7.62)
NEUTS SEG NFR BLD AUTO: 60 % (ref 43–75)
NONHDLC SERPL-MCNC: 76 MG/DL
NRBC BLD AUTO-RTO: 0 /100 WBCS
PLATELET # BLD AUTO: 117 THOUSANDS/UL (ref 149–390)
PMV BLD AUTO: 11.9 FL (ref 8.9–12.7)
POTASSIUM SERPL-SCNC: 4.4 MMOL/L (ref 3.5–5.3)
PROT SERPL-MCNC: 7.3 G/DL (ref 6.4–8.4)
RBC # BLD AUTO: 4.97 MILLION/UL (ref 3.81–5.12)
SODIUM SERPL-SCNC: 139 MMOL/L (ref 135–147)
TRIGL SERPL-MCNC: 83 MG/DL
TSH SERPL DL<=0.05 MIU/L-ACNC: 5.14 UIU/ML (ref 0.45–4.5)
VIT B12 SERPL-MCNC: 231 PG/ML (ref 180–914)
WBC # BLD AUTO: 7.1 THOUSAND/UL (ref 4.31–10.16)

## 2023-09-18 PROCEDURE — 82977 ASSAY OF GGT: CPT

## 2023-09-18 PROCEDURE — 80162 ASSAY OF DIGOXIN TOTAL: CPT

## 2023-09-18 PROCEDURE — 85025 COMPLETE CBC W/AUTO DIFF WBC: CPT

## 2023-09-18 PROCEDURE — 86300 IMMUNOASSAY TUMOR CA 15-3: CPT

## 2023-09-18 PROCEDURE — 82607 VITAMIN B-12: CPT

## 2023-09-18 PROCEDURE — 84443 ASSAY THYROID STIM HORMONE: CPT

## 2023-09-18 PROCEDURE — 80061 LIPID PANEL: CPT

## 2023-09-18 PROCEDURE — 80053 COMPREHEN METABOLIC PANEL: CPT

## 2023-09-18 PROCEDURE — 36415 COLL VENOUS BLD VENIPUNCTURE: CPT

## 2023-09-18 PROCEDURE — 83036 HEMOGLOBIN GLYCOSYLATED A1C: CPT

## 2023-09-19 LAB — CANCER AG27-29 SERPL-ACNC: 10.2 U/ML (ref 0–38.6)

## 2023-12-07 ENCOUNTER — APPOINTMENT (OUTPATIENT)
Dept: LAB | Facility: CLINIC | Age: 73
End: 2023-12-07
Payer: COMMERCIAL

## 2023-12-07 ENCOUNTER — TRANSCRIBE ORDERS (OUTPATIENT)
Dept: LAB | Facility: CLINIC | Age: 73
End: 2023-12-07

## 2023-12-07 DIAGNOSIS — Z79.01 LONG TERM (CURRENT) USE OF ANTICOAGULANTS: ICD-10-CM

## 2023-12-07 DIAGNOSIS — Z92.23 HISTORY OF ESTROGEN THERAPY: ICD-10-CM

## 2023-12-07 DIAGNOSIS — C50.511 MALIGNANT NEOPLASM OF LOWER-OUTER QUADRANT OF RIGHT FEMALE BREAST, UNSPECIFIED ESTROGEN RECEPTOR STATUS (HCC): ICD-10-CM

## 2023-12-07 DIAGNOSIS — E78.2 MIXED HYPERLIPIDEMIA: ICD-10-CM

## 2023-12-07 DIAGNOSIS — I48.0 PAROXYSMAL ATRIAL FIBRILLATION (HCC): ICD-10-CM

## 2023-12-07 DIAGNOSIS — R39.9 GENITOURINARY SYMPTOMS: ICD-10-CM

## 2023-12-07 DIAGNOSIS — I10 ESSENTIAL HYPERTENSION, MALIGNANT: ICD-10-CM

## 2023-12-07 DIAGNOSIS — E13.69 OTHER SPECIFIED DIABETES MELLITUS WITH OTHER SPECIFIED COMPLICATION, UNSPECIFIED WHETHER LONG TERM INSULIN USE (HCC): ICD-10-CM

## 2023-12-07 DIAGNOSIS — I48.91 ATRIAL FIBRILLATION, UNSPECIFIED TYPE (HCC): ICD-10-CM

## 2023-12-07 DIAGNOSIS — Z17.0 ESTROGEN RECEPTOR POSITIVE: ICD-10-CM

## 2023-12-07 DIAGNOSIS — C50.511 MALIGNANT NEOPLASM OF LOWER-OUTER QUADRANT OF RIGHT FEMALE BREAST, UNSPECIFIED ESTROGEN RECEPTOR STATUS (HCC): Primary | ICD-10-CM

## 2023-12-07 LAB
ALBUMIN SERPL BCP-MCNC: 4 G/DL (ref 3.5–5)
ALP SERPL-CCNC: 55 U/L (ref 34–104)
ALT SERPL W P-5'-P-CCNC: 38 U/L (ref 7–52)
ANION GAP SERPL CALCULATED.3IONS-SCNC: 9 MMOL/L
AST SERPL W P-5'-P-CCNC: 31 U/L (ref 13–39)
BACTERIA UR QL AUTO: ABNORMAL /HPF
BASOPHILS # BLD AUTO: 0.07 THOUSANDS/ÂΜL (ref 0–0.1)
BASOPHILS NFR BLD AUTO: 1 % (ref 0–1)
BILIRUB SERPL-MCNC: 0.44 MG/DL (ref 0.2–1)
BILIRUB UR QL STRIP: NEGATIVE
BUN SERPL-MCNC: 11 MG/DL (ref 5–25)
CALCIUM SERPL-MCNC: 8.9 MG/DL (ref 8.4–10.2)
CHLORIDE SERPL-SCNC: 106 MMOL/L (ref 96–108)
CHOLEST SERPL-MCNC: 119 MG/DL
CK SERPL-CCNC: 160 U/L (ref 26–192)
CLARITY UR: CLEAR
CO2 SERPL-SCNC: 24 MMOL/L (ref 21–32)
COLOR UR: YELLOW
CREAT SERPL-MCNC: 0.69 MG/DL (ref 0.6–1.3)
DIGOXIN SERPL-MCNC: 0.6 NG/ML (ref 0.8–2)
EOSINOPHIL # BLD AUTO: 0.19 THOUSAND/ÂΜL (ref 0–0.61)
EOSINOPHIL NFR BLD AUTO: 3 % (ref 0–6)
ERYTHROCYTE [DISTWIDTH] IN BLOOD BY AUTOMATED COUNT: 15.5 % (ref 11.6–15.1)
ERYTHROCYTE [SEDIMENTATION RATE] IN BLOOD: 64 MM/HOUR (ref 0–29)
EST. AVERAGE GLUCOSE BLD GHB EST-MCNC: 148 MG/DL
FERRITIN SERPL-MCNC: 55 NG/ML (ref 11–307)
FOLATE SERPL-MCNC: 11.4 NG/ML
GFR SERPL CREATININE-BSD FRML MDRD: 88 ML/MIN/1.73SQ M
GGT SERPL-CCNC: 42 U/L (ref 9–64)
GLUCOSE P FAST SERPL-MCNC: 115 MG/DL (ref 65–99)
GLUCOSE UR STRIP-MCNC: NEGATIVE MG/DL
HBA1C MFR BLD: 6.8 %
HCT VFR BLD AUTO: 40.5 % (ref 34.8–46.1)
HDLC SERPL-MCNC: 38 MG/DL
HGB BLD-MCNC: 13.1 G/DL (ref 11.5–15.4)
HGB UR QL STRIP.AUTO: ABNORMAL
IMM GRANULOCYTES # BLD AUTO: 0.05 THOUSAND/UL (ref 0–0.2)
IMM GRANULOCYTES NFR BLD AUTO: 1 % (ref 0–2)
IRON SATN MFR SERPL: 14 % (ref 15–50)
IRON SERPL-MCNC: 53 UG/DL (ref 50–212)
KETONES UR STRIP-MCNC: NEGATIVE MG/DL
LDH SERPL-CCNC: 158 U/L (ref 140–271)
LDLC SERPL CALC-MCNC: 66 MG/DL (ref 0–100)
LEUKOCYTE ESTERASE UR QL STRIP: ABNORMAL
LYMPHOCYTES # BLD AUTO: 2.4 THOUSANDS/ÂΜL (ref 0.6–4.47)
LYMPHOCYTES NFR BLD AUTO: 33 % (ref 14–44)
MCH RBC QN AUTO: 28 PG (ref 26.8–34.3)
MCHC RBC AUTO-ENTMCNC: 32.3 G/DL (ref 31.4–37.4)
MCV RBC AUTO: 87 FL (ref 82–98)
MONOCYTES # BLD AUTO: 0.35 THOUSAND/ÂΜL (ref 0.17–1.22)
MONOCYTES NFR BLD AUTO: 5 % (ref 4–12)
NEUTROPHILS # BLD AUTO: 4.27 THOUSANDS/ÂΜL (ref 1.85–7.62)
NEUTS SEG NFR BLD AUTO: 57 % (ref 43–75)
NITRITE UR QL STRIP: POSITIVE
NON-SQ EPI CELLS URNS QL MICRO: ABNORMAL /HPF
NONHDLC SERPL-MCNC: 81 MG/DL
NRBC BLD AUTO-RTO: 0 /100 WBCS
PH UR STRIP.AUTO: 6 [PH]
PLATELET # BLD AUTO: 128 THOUSANDS/UL (ref 149–390)
PMV BLD AUTO: 12.5 FL (ref 8.9–12.7)
POTASSIUM SERPL-SCNC: 4 MMOL/L (ref 3.5–5.3)
PROT SERPL-MCNC: 6.9 G/DL (ref 6.4–8.4)
PROT UR STRIP-MCNC: ABNORMAL MG/DL
RBC # BLD AUTO: 4.68 MILLION/UL (ref 3.81–5.12)
RBC #/AREA URNS AUTO: ABNORMAL /HPF
SODIUM SERPL-SCNC: 139 MMOL/L (ref 135–147)
SP GR UR STRIP.AUTO: 1.02 (ref 1–1.03)
T4 FREE SERPL-MCNC: 0.82 NG/DL (ref 0.61–1.12)
TIBC SERPL-MCNC: 385 UG/DL (ref 250–450)
TRIGL SERPL-MCNC: 76 MG/DL
TSH SERPL DL<=0.05 MIU/L-ACNC: 3.28 UIU/ML (ref 0.45–4.5)
UIBC SERPL-MCNC: 332 UG/DL (ref 155–355)
UROBILINOGEN UR STRIP-ACNC: <2 MG/DL
VIT B12 SERPL-MCNC: 753 PG/ML (ref 180–914)
WBC # BLD AUTO: 7.33 THOUSAND/UL (ref 4.31–10.16)
WBC #/AREA URNS AUTO: ABNORMAL /HPF

## 2023-12-07 PROCEDURE — 83615 LACTATE (LD) (LDH) ENZYME: CPT

## 2023-12-07 PROCEDURE — 85025 COMPLETE CBC W/AUTO DIFF WBC: CPT

## 2023-12-07 PROCEDURE — 82746 ASSAY OF FOLIC ACID SERUM: CPT

## 2023-12-07 PROCEDURE — 83540 ASSAY OF IRON: CPT

## 2023-12-07 PROCEDURE — 80162 ASSAY OF DIGOXIN TOTAL: CPT

## 2023-12-07 PROCEDURE — 82550 ASSAY OF CK (CPK): CPT

## 2023-12-07 PROCEDURE — 82977 ASSAY OF GGT: CPT

## 2023-12-07 PROCEDURE — 85652 RBC SED RATE AUTOMATED: CPT

## 2023-12-07 PROCEDURE — 84439 ASSAY OF FREE THYROXINE: CPT

## 2023-12-07 PROCEDURE — 84443 ASSAY THYROID STIM HORMONE: CPT

## 2023-12-07 PROCEDURE — 81001 URINALYSIS AUTO W/SCOPE: CPT

## 2023-12-07 PROCEDURE — 80053 COMPREHEN METABOLIC PANEL: CPT

## 2023-12-07 PROCEDURE — 36415 COLL VENOUS BLD VENIPUNCTURE: CPT

## 2023-12-07 PROCEDURE — 82607 VITAMIN B-12: CPT

## 2023-12-07 PROCEDURE — 87086 URINE CULTURE/COLONY COUNT: CPT

## 2023-12-07 PROCEDURE — 87186 SC STD MICRODIL/AGAR DIL: CPT

## 2023-12-07 PROCEDURE — 82728 ASSAY OF FERRITIN: CPT

## 2023-12-07 PROCEDURE — 87077 CULTURE AEROBIC IDENTIFY: CPT

## 2023-12-07 PROCEDURE — 80061 LIPID PANEL: CPT

## 2023-12-07 PROCEDURE — 83550 IRON BINDING TEST: CPT

## 2023-12-07 PROCEDURE — 86300 IMMUNOASSAY TUMOR CA 15-3: CPT

## 2023-12-07 PROCEDURE — 83036 HEMOGLOBIN GLYCOSYLATED A1C: CPT

## 2023-12-08 LAB — CANCER AG27-29 SERPL-ACNC: 12.3 U/ML (ref 0–38.6)

## 2023-12-09 LAB — BACTERIA UR CULT: ABNORMAL

## 2024-03-11 ENCOUNTER — TRANSCRIBE ORDERS (OUTPATIENT)
Dept: LAB | Facility: CLINIC | Age: 74
End: 2024-03-11

## 2024-03-11 ENCOUNTER — APPOINTMENT (OUTPATIENT)
Dept: LAB | Facility: CLINIC | Age: 74
End: 2024-03-11
Payer: COMMERCIAL

## 2024-03-11 DIAGNOSIS — Z90.710 VAGINAL PAP SMEAR FOLLOWING HYSTERECTOMY FOR MALIGNANCY: ICD-10-CM

## 2024-03-11 DIAGNOSIS — Z17.0 ESTROGEN RECEPTOR POSITIVE: ICD-10-CM

## 2024-03-11 DIAGNOSIS — N30.00 ACUTE CYSTITIS WITHOUT HEMATURIA: ICD-10-CM

## 2024-03-11 DIAGNOSIS — C50.511 MALIGNANT NEOPLASM OF LOWER-OUTER QUADRANT OF RIGHT FEMALE BREAST, UNSPECIFIED ESTROGEN RECEPTOR STATUS (HCC): ICD-10-CM

## 2024-03-11 DIAGNOSIS — Z17.0 ESTROGEN RECEPTOR POSITIVE: Primary | ICD-10-CM

## 2024-03-11 DIAGNOSIS — Z08 VAGINAL PAP SMEAR FOLLOWING HYSTERECTOMY FOR MALIGNANCY: ICD-10-CM

## 2024-03-11 LAB
ALBUMIN SERPL BCP-MCNC: 4.2 G/DL (ref 3.5–5)
ALP SERPL-CCNC: 57 U/L (ref 34–104)
ALT SERPL W P-5'-P-CCNC: 36 U/L (ref 7–52)
ANION GAP SERPL CALCULATED.3IONS-SCNC: 11 MMOL/L
AST SERPL W P-5'-P-CCNC: 30 U/L (ref 13–39)
BACTERIA UR QL AUTO: ABNORMAL /HPF
BASOPHILS # BLD AUTO: 0.07 THOUSANDS/ÂΜL (ref 0–0.1)
BASOPHILS NFR BLD AUTO: 1 % (ref 0–1)
BILIRUB SERPL-MCNC: 0.49 MG/DL (ref 0.2–1)
BILIRUB UR QL STRIP: NEGATIVE
BUN SERPL-MCNC: 11 MG/DL (ref 5–25)
CALCIUM SERPL-MCNC: 9.5 MG/DL (ref 8.4–10.2)
CHLORIDE SERPL-SCNC: 106 MMOL/L (ref 96–108)
CLARITY UR: CLEAR
CO2 SERPL-SCNC: 23 MMOL/L (ref 21–32)
COLOR UR: YELLOW
CREAT SERPL-MCNC: 0.79 MG/DL (ref 0.6–1.3)
EOSINOPHIL # BLD AUTO: 0.15 THOUSAND/ÂΜL (ref 0–0.61)
EOSINOPHIL NFR BLD AUTO: 2 % (ref 0–6)
ERYTHROCYTE [DISTWIDTH] IN BLOOD BY AUTOMATED COUNT: 15.8 % (ref 11.6–15.1)
GFR SERPL CREATININE-BSD FRML MDRD: 75 ML/MIN/1.73SQ M
GGT SERPL-CCNC: 38 U/L (ref 9–64)
GLUCOSE P FAST SERPL-MCNC: 122 MG/DL (ref 65–99)
GLUCOSE UR STRIP-MCNC: NEGATIVE MG/DL
HCT VFR BLD AUTO: 40.8 % (ref 34.8–46.1)
HGB BLD-MCNC: 13 G/DL (ref 11.5–15.4)
HGB UR QL STRIP.AUTO: NEGATIVE
IMM GRANULOCYTES # BLD AUTO: 0.03 THOUSAND/UL (ref 0–0.2)
IMM GRANULOCYTES NFR BLD AUTO: 0 % (ref 0–2)
KETONES UR STRIP-MCNC: NEGATIVE MG/DL
LEUKOCYTE ESTERASE UR QL STRIP: NEGATIVE
LYMPHOCYTES # BLD AUTO: 2.02 THOUSANDS/ÂΜL (ref 0.6–4.47)
LYMPHOCYTES NFR BLD AUTO: 30 % (ref 14–44)
MCH RBC QN AUTO: 27.1 PG (ref 26.8–34.3)
MCHC RBC AUTO-ENTMCNC: 31.9 G/DL (ref 31.4–37.4)
MCV RBC AUTO: 85 FL (ref 82–98)
MONOCYTES # BLD AUTO: 0.32 THOUSAND/ÂΜL (ref 0.17–1.22)
MONOCYTES NFR BLD AUTO: 5 % (ref 4–12)
MUCOUS THREADS UR QL AUTO: ABNORMAL
NEUTROPHILS # BLD AUTO: 4.21 THOUSANDS/ÂΜL (ref 1.85–7.62)
NEUTS SEG NFR BLD AUTO: 62 % (ref 43–75)
NITRITE UR QL STRIP: NEGATIVE
NON-SQ EPI CELLS URNS QL MICRO: ABNORMAL /HPF
NRBC BLD AUTO-RTO: 0 /100 WBCS
PH UR STRIP.AUTO: 5.5 [PH]
PLATELET # BLD AUTO: 121 THOUSANDS/UL (ref 149–390)
PMV BLD AUTO: 12.4 FL (ref 8.9–12.7)
POTASSIUM SERPL-SCNC: 4.1 MMOL/L (ref 3.5–5.3)
PROT SERPL-MCNC: 7.1 G/DL (ref 6.4–8.4)
PROT UR STRIP-MCNC: ABNORMAL MG/DL
RBC # BLD AUTO: 4.79 MILLION/UL (ref 3.81–5.12)
RBC #/AREA URNS AUTO: ABNORMAL /HPF
SODIUM SERPL-SCNC: 140 MMOL/L (ref 135–147)
SP GR UR STRIP.AUTO: 1.03 (ref 1–1.03)
UROBILINOGEN UR STRIP-ACNC: <2 MG/DL
WBC # BLD AUTO: 6.8 THOUSAND/UL (ref 4.31–10.16)
WBC #/AREA URNS AUTO: ABNORMAL /HPF

## 2024-03-11 PROCEDURE — 81001 URINALYSIS AUTO W/SCOPE: CPT

## 2024-03-11 PROCEDURE — 82977 ASSAY OF GGT: CPT

## 2024-03-11 PROCEDURE — 85025 COMPLETE CBC W/AUTO DIFF WBC: CPT

## 2024-03-11 PROCEDURE — 36415 COLL VENOUS BLD VENIPUNCTURE: CPT

## 2024-03-11 PROCEDURE — 87086 URINE CULTURE/COLONY COUNT: CPT

## 2024-03-11 PROCEDURE — 86300 IMMUNOASSAY TUMOR CA 15-3: CPT

## 2024-03-11 PROCEDURE — 80053 COMPREHEN METABOLIC PANEL: CPT

## 2024-03-12 LAB
BACTERIA UR CULT: NORMAL
CANCER AG27-29 SERPL-ACNC: 11.9 U/ML (ref 0–38.6)

## 2024-03-16 ENCOUNTER — APPOINTMENT (EMERGENCY)
Dept: CT IMAGING | Facility: HOSPITAL | Age: 74
End: 2024-03-16
Payer: COMMERCIAL

## 2024-03-16 ENCOUNTER — HOSPITAL ENCOUNTER (EMERGENCY)
Facility: HOSPITAL | Age: 74
Discharge: HOME | End: 2024-03-16
Attending: STUDENT IN AN ORGANIZED HEALTH CARE EDUCATION/TRAINING PROGRAM
Payer: COMMERCIAL

## 2024-03-16 VITALS
DIASTOLIC BLOOD PRESSURE: 72 MMHG | HEART RATE: 91 BPM | OXYGEN SATURATION: 94 % | RESPIRATION RATE: 18 BRPM | TEMPERATURE: 98.8 F | SYSTOLIC BLOOD PRESSURE: 156 MMHG

## 2024-03-16 DIAGNOSIS — L03.211 CELLULITIS OF FACE: Primary | ICD-10-CM

## 2024-03-16 DIAGNOSIS — K04.7 DENTAL ABSCESS: ICD-10-CM

## 2024-03-16 DIAGNOSIS — M27.2 ODONTOGENIC INFECTION OF JAW: ICD-10-CM

## 2024-03-16 LAB
ANION GAP SERPL CALCULATED.3IONS-SCNC: 9 MMOL/L (ref 4–13)
BASOPHILS # BLD AUTO: 0.06 THOUSANDS/ÂΜL (ref 0–0.1)
BASOPHILS NFR BLD AUTO: 1 % (ref 0–1)
BUN SERPL-MCNC: 8 MG/DL (ref 5–25)
CALCIUM SERPL-MCNC: 9.9 MG/DL (ref 8.4–10.2)
CHLORIDE SERPL-SCNC: 103 MMOL/L (ref 96–108)
CO2 SERPL-SCNC: 25 MMOL/L (ref 21–32)
CREAT SERPL-MCNC: 0.67 MG/DL (ref 0.6–1.3)
EOSINOPHIL # BLD AUTO: 0.2 THOUSAND/ÂΜL (ref 0–0.61)
EOSINOPHIL NFR BLD AUTO: 2 % (ref 0–6)
ERYTHROCYTE [DISTWIDTH] IN BLOOD BY AUTOMATED COUNT: 15.9 % (ref 11.6–15.1)
GFR SERPL CREATININE-BSD FRML MDRD: 88 ML/MIN/1.73SQ M
GLUCOSE SERPL-MCNC: 84 MG/DL (ref 65–140)
HCT VFR BLD AUTO: 42 % (ref 34.8–46.1)
HGB BLD-MCNC: 13.3 G/DL (ref 11.5–15.4)
IMM GRANULOCYTES # BLD AUTO: 0.06 THOUSAND/UL (ref 0–0.2)
IMM GRANULOCYTES NFR BLD AUTO: 1 % (ref 0–2)
LYMPHOCYTES # BLD AUTO: 2.53 THOUSANDS/ÂΜL (ref 0.6–4.47)
LYMPHOCYTES NFR BLD AUTO: 26 % (ref 14–44)
MCH RBC QN AUTO: 27 PG (ref 26.8–34.3)
MCHC RBC AUTO-ENTMCNC: 31.7 G/DL (ref 31.4–37.4)
MCV RBC AUTO: 85 FL (ref 82–98)
MONOCYTES # BLD AUTO: 0.6 THOUSAND/ÂΜL (ref 0.17–1.22)
MONOCYTES NFR BLD AUTO: 6 % (ref 4–12)
NEUTROPHILS # BLD AUTO: 6.18 THOUSANDS/ÂΜL (ref 1.85–7.62)
NEUTS SEG NFR BLD AUTO: 64 % (ref 43–75)
NRBC BLD AUTO-RTO: 0 /100 WBCS
PLATELET # BLD AUTO: 124 THOUSANDS/UL (ref 149–390)
PMV BLD AUTO: 11.6 FL (ref 8.9–12.7)
POTASSIUM SERPL-SCNC: 4.2 MMOL/L (ref 3.5–5.3)
RBC # BLD AUTO: 4.92 MILLION/UL (ref 3.81–5.12)
SODIUM SERPL-SCNC: 137 MMOL/L (ref 135–147)
WBC # BLD AUTO: 9.63 THOUSAND/UL (ref 4.31–10.16)

## 2024-03-16 PROCEDURE — 80048 BASIC METABOLIC PNL TOTAL CA: CPT

## 2024-03-16 PROCEDURE — 36415 COLL VENOUS BLD VENIPUNCTURE: CPT

## 2024-03-16 PROCEDURE — 70491 CT SOFT TISSUE NECK W/DYE: CPT

## 2024-03-16 PROCEDURE — 85025 COMPLETE CBC W/AUTO DIFF WBC: CPT

## 2024-03-16 PROCEDURE — 99283 EMERGENCY DEPT VISIT LOW MDM: CPT

## 2024-03-16 PROCEDURE — 99285 EMERGENCY DEPT VISIT HI MDM: CPT | Performed by: STUDENT IN AN ORGANIZED HEALTH CARE EDUCATION/TRAINING PROGRAM

## 2024-03-16 RX ORDER — DIPHENHYDRAMINE HYDROCHLORIDE AND LIDOCAINE HYDROCHLORIDE AND ALUMINUM HYDROXIDE AND MAGNESIUM HYDRO
10 KIT ONCE
Status: COMPLETED | OUTPATIENT
Start: 2024-03-16 | End: 2024-03-16

## 2024-03-16 RX ORDER — DIPHENHYDRAMINE HYDROCHLORIDE AND LIDOCAINE HYDROCHLORIDE AND ALUMINUM HYDROXIDE AND MAGNESIUM HYDRO
10 KIT EVERY 4 HOURS PRN
Qty: 119 ML | Refills: 0 | Status: SHIPPED | OUTPATIENT
Start: 2024-03-16

## 2024-03-16 RX ORDER — IBUPROFEN 600 MG/1
600 TABLET ORAL ONCE
Status: COMPLETED | OUTPATIENT
Start: 2024-03-16 | End: 2024-03-16

## 2024-03-16 RX ADMIN — IBUPROFEN 600 MG: 600 TABLET, FILM COATED ORAL at 16:36

## 2024-03-16 RX ADMIN — DIPHENHYDRAMINE HYDROCHLORIDE AND LIDOCAINE HYDROCHLORIDE AND ALUMINUM HYDROXIDE AND MAGNESIUM HYDRO 10 ML: KIT at 16:37

## 2024-03-16 RX ADMIN — IOHEXOL 85 ML: 350 INJECTION, SOLUTION INTRAVENOUS at 19:35

## 2024-03-16 RX ADMIN — DIPHENHYDRAMINE HYDROCHLORIDE AND LIDOCAINE HYDROCHLORIDE AND ALUMINUM HYDROXIDE AND MAGNESIUM HYDRO 10 ML: KIT at 20:45

## 2024-03-16 NOTE — ED ATTENDING ATTESTATION
3/16/2024  IAriel DO, saw and evaluated the patient. I have discussed the patient with the resident/non-physician practitioner and agree with the resident's/non-physician practitioner's findings, Plan of Care, and MDM as documented in the resident's/non-physician practitioner's note, except where noted. All available labs and Radiology studies were reviewed.  I was present for key portions of any procedure(s) performed by the resident/non-physician practitioner and I was immediately available to provide assistance.       At this point I agree with the current assessment done in the Emergency Department.  I have conducted an independent evaluation of this patient a history and physical is as follows:    73-year-old female presents to the ED for evaluation of left facial swelling.  Has had on and off issues with chipped tooth in the left mandibular molar region.  Has been seen by dentistry, completed round of amoxicillin without much relief and was recently transitioned to clindamycin which she started 1 to 2 days ago.  Symptoms have since been improving but due to the swelling of the left face/jaw, presents to the ED for evaluation.  No fevers.  Able to tolerate secretions.  No pain with swallowing.  On exam, does have fractured left mandibular molar with mild tenderness in the area no erythema or periapical abscess, she does have left mandibular swelling externally.  No tongue protrusion or elevation.  No meningeal signs.  No decreased range of motion of the neck.  No uvular deviation.  Labs show no leukocytosis, stable hemoglobin, no acute electrolyte abnormalities, normal renal function.  CT neck soft tissues shows cellulitis and myositis superficial to the body of the left mandible likely dental in nature with associated 1 cm soft tissue abscess.  Not an area amenable for ED I&D.  Results discussed.  Plan will be for discharge with close outpatient follow-up.  Discussed importance of close follow-up with  primary care, dentistry.  Will give information for OMFS.  Discussed importance of continuing previously prescribed clindamycin.  Stable for discharge and agreeable to the plan.  Strict return ED precautions given    ED Course         Critical Care Time  Procedures

## 2024-03-16 NOTE — ED PROVIDER NOTES
History  Chief Complaint   Patient presents with    Oral Swelling     Pt has cracked tooth on L side of mouth, pt recently put on antibiotics. Reports swelling and redness on L side of face. No difficulty breathing.      HPI  73-year-old female presents for evaluation of facial swelling.  She reports a cracked tooth on the left side of her mouth, for which she follows with a dentist.  She was recently placed on antibiotics for pain in that tooth.  She completed an initial 5-day course, and then was placed on another due to no relief of symptoms.  She developed left facial swelling 2 days ago, and her dentist subsequently switched her from amoxicillin to clindamycin.  She reports slight improvement in swelling, but came to ED for evaluation of swelling.  She reports associated nausea.  She is still able to tolerate p.o. intake.  She denies fever, chills, neck stiffness, drooling, SOB.    Prior to Admission Medications   Prescriptions Last Dose Informant Patient Reported? Taking?   ALPRAZolam (XANAX) 0.25 mg tablet  Self Yes No   Cholecalciferol 25 MCG (1000 UT) tablet  Self Yes No   Sig: Take 1,000 Units by mouth daily   Docusate Sodium (DSS) 100 MG CAPS  Self Yes No   Sig: Take 100 mg by mouth 2 (two) times a day   Patient not taking: Reported on 7/23/2023   Eliquis 5 MG  Self Yes No   Sig: Take 5 mg by mouth 2 (two) times a day   Lancets (OneTouch Delica Plus [id number]) MISC  Self Yes No   Sig: USE TO TEST ONCE DAILY   OneTouch Verio test strip  Self Yes No   Sig: USE TO TEST ONCE DAILY   Pramipexole Dihydrochloride ER 0.375 MG TB24  Self Yes No   Sig: TAKE 1 TABLET BY MOUTH EVERY DAY   amLODIPine Besylate (NORVASC PO)  Self Yes No   Sig: Take by mouth   aspirin 81 MG tablet  Self Yes No   Sig: Take by mouth   azithromycin (AZASITE) 1 % ophthalmic solution  Self No No   Sig: Apply 1 ggt in eye(s) bid x2 days, then qd x5 days   ciprofloxacin (CIPRO) 500 mg tablet  Self Yes No   digoxin (LANOXIN) 0.25 mg tablet  Self  Yes No   Sig: TAKE 1 TABLET BY MOUTH EVERY DAY   diphenhydrAMINE (BENADRYL) 25 mg capsule  Self Yes No   Sig: Take 25 mg by mouth every 6 (six) hours as needed   erythromycin (ILOTYCIN) ophthalmic ointment  Self No No   Sig: Administer 0.5 inches to both eyes every 4 (four) hours   esomeprazole (NexIUM) 40 MG capsule  Self Yes No   Sig: Take by mouth   Patient not taking: Reported on 3/29/2022   fluticasone (FLONASE) 50 mcg/act nasal spray   No No   Si spray into each nostril 2 (two) times a day   lisinopril (ZESTRIL) 5 mg tablet  Self Yes No   Sig: Take 5 mg by mouth daily   loratadine (CLARITIN) 10 mg tablet  Self No No   Sig: Take 1 tablet (10 mg total) by mouth daily   metFORMIN (GLUCOPHAGE-XR) 500 mg 24 hr tablet   Yes No   Sig: TAKE 2 TABLETS BY MOUTH ONCE DAILY. MUST MAKE AN APPT FOR FURTHER REFILLS'   olopatadine (PATANOL) 0.1 % ophthalmic solution  Self No No   Sig: Administer 1 drop to both eyes 2 (two) times a day   phenazopyridine (PYRIDIUM) 100 mg tablet  Self No No   Sig: Take 1 tablet (100 mg total) by mouth 3 (three) times a day as needed for bladder spasms   pramipexole (MIRAPEX) 0.25 mg tablet  Self Yes No   Sig: Take by mouth   predniSONE 10 mg tablet  Self No No   Sig: TAKE 4 TAB PO DAILY X 3 DAYS, THEN 3 TAB DAILY X 2 DAYS, THEN 2 TAB DAILY X 2 DAYS, THEN 1 TAB DAILY X 2 DAYS, WITH FOOD   Patient not taking: No sig reported   rosuvastatin (CRESTOR) 10 MG tablet  Self Yes No   Sig: Take by mouth   spironolactone (ALDACTONE) 50 mg tablet  Self Yes No   Sig: Take by mouth   sulfacetamide-prednisolone (BLEPHAMIDE) 10-0.2 % ophthalmic suspension  Self No No   Sig: Administer 1 drop to both eyes every 3 (three) hours   Patient not taking: No sig reported   tamoxifen (NOLVADEX) 20 mg tablet  Self Yes No   Sig: TAIKE 1 TABLET BY MOUTH DAILY   Patient not taking: Reported on 10/27/2022   tolterodine (DETROL LA) 4 mg 24 hr capsule  Self Yes No   Sig: Take 4 mg by mouth daily   venlafaxine (EFFEXOR-XR)  75 mg 24 hr capsule  Self Yes No   Sig: Take 75 mg by mouth daily      Facility-Administered Medications: None       History reviewed. No pertinent past medical history.    Past Surgical History:   Procedure Laterality Date    CHOLECYSTECTOMY      MAMMO NEEDLE LOCALIZATION RIGHT (ALL INC) Right 02/27/2015    MASTECTOMY Right 2016       Family History   Problem Relation Age of Onset    Arthritis Mother     Heart disease Father     Colon cancer Maternal Grandfather      I have reviewed and agree with the history as documented.    E-Cigarette/Vaping    E-Cigarette Use Never User      E-Cigarette/Vaping Substances    Nicotine No     THC No     CBD No     Flavoring No     Other No     Unknown No      Social History     Tobacco Use    Smoking status: Never    Smokeless tobacco: Never   Vaping Use    Vaping status: Never Used   Substance Use Topics    Alcohol use: No    Drug use: No        Review of Systems   Constitutional:  Negative for chills and fever.   HENT:  Positive for dental problem and facial swelling. Negative for drooling, ear pain and sore throat.    Eyes:  Negative for visual disturbance.   Respiratory:  Negative for shortness of breath and wheezing.    Cardiovascular:  Negative for chest pain and palpitations.   Gastrointestinal:  Positive for nausea. Negative for abdominal pain and vomiting.   Genitourinary:  Negative for dysuria and hematuria.   Neurological:  Negative for seizures and syncope.   All other systems reviewed and are negative.      Physical Exam  ED Triage Vitals [03/16/24 1406]   Temperature Pulse Respirations Blood Pressure SpO2   98.8 °F (37.1 °C) 94 18 148/67 95 %      Temp Source Heart Rate Source Patient Position - Orthostatic VS BP Location FiO2 (%)   Oral Monitor Sitting Left arm --      Pain Score       No Pain             Orthostatic Vital Signs  Vitals:    03/16/24 1406 03/16/24 1749 03/16/24 2005   BP: 148/67 160/72 156/72   Pulse: 94 80 91   Patient Position - Orthostatic VS:  Sitting         Physical Exam  Vitals and nursing note reviewed.   Constitutional:       General: She is not in acute distress.     Appearance: She is well-developed.   HENT:      Head: Normocephalic and atraumatic.      Mouth/Throat:        Comments: Soft tissue swelling noted over left jaw, cheek, causing decreased ability to open jaw fully.  Unable to fully evaluate uvula and posterior oropharynx.  Eyes:      Conjunctiva/sclera: Conjunctivae normal.   Cardiovascular:      Rate and Rhythm: Normal rate and regular rhythm.      Heart sounds: No murmur heard.  Pulmonary:      Effort: Pulmonary effort is normal. No respiratory distress.      Breath sounds: Normal breath sounds.   Abdominal:      Palpations: Abdomen is soft.      Tenderness: There is no abdominal tenderness.   Musculoskeletal:         General: No swelling.      Cervical back: Neck supple.   Skin:     General: Skin is warm and dry.      Capillary Refill: Capillary refill takes less than 2 seconds.   Neurological:      Mental Status: She is alert.   Psychiatric:         Mood and Affect: Mood normal.         ED Medications  Medications   diphenhydramine, lidocaine, Al/Mg hydroxide, simethicone (Magic Mouthwash) oral solution 10 mL (10 mL Swish & Spit Given 3/16/24 1637)   ibuprofen (MOTRIN) tablet 600 mg (600 mg Oral Given 3/16/24 1636)   iohexol (OMNIPAQUE) 350 MG/ML injection (MULTI-DOSE) 85 mL (85 mL Intravenous Given 3/16/24 1935)   diphenhydramine, lidocaine, Al/Mg hydroxide, simethicone (Magic Mouthwash) oral solution 10 mL (10 mL Swish & Spit Given 3/16/24 2045)       Diagnostic Studies  Results Reviewed       Procedure Component Value Units Date/Time    CBC and differential [[id number]]  (Abnormal) Collected: 03/16/24 1746    Lab Status: Final result Specimen: Blood from Hand, Left Updated: 03/16/24 1825     WBC 9.63 Thousand/uL      RBC 4.92 Million/uL      Hemoglobin 13.3 g/dL      Hematocrit 42.0 %      MCV 85 fL      MCH 27.0 pg      MCHC 31.7  g/dL      RDW 15.9 %      MPV 11.6 fL      Platelets 124 Thousands/uL      nRBC 0 /100 WBCs      Neutrophils Relative 64 %      Immature Grans % 1 %      Lymphocytes Relative 26 %      Monocytes Relative 6 %      Eosinophils Relative 2 %      Basophils Relative 1 %      Neutrophils Absolute 6.18 Thousands/µL      Absolute Immature Grans 0.06 Thousand/uL      Absolute Lymphocytes 2.53 Thousands/µL      Absolute Monocytes 0.60 Thousand/µL      Eosinophils Absolute 0.20 Thousand/µL      Basophils Absolute 0.06 Thousands/µL     Basic metabolic panel [[id number]] Collected: 03/16/24 1746    Lab Status: Final result Specimen: Blood from Hand, Left Updated: 03/16/24 1819     Sodium 137 mmol/L      Potassium 4.2 mmol/L      Chloride 103 mmol/L      CO2 25 mmol/L      ANION GAP 9 mmol/L      BUN 8 mg/dL      Creatinine 0.67 mg/dL      Glucose 84 mg/dL      Calcium 9.9 mg/dL      eGFR 88 ml/min/1.73sq m     Narrative:      [employer] guidelines for Chronic Kidney Disease (CKD):     Stage 1 with normal or high GFR (GFR > 90 mL/min/1.73 square meters)    Stage 2 Mild CKD (GFR = 60-89 mL/min/1.73 square meters)    Stage 3A Moderate CKD (GFR = 45-59 mL/min/1.73 square meters)    Stage 3B Moderate CKD (GFR = 30-44 mL/min/1.73 square meters)    Stage 4 Severe CKD (GFR = 15-29 mL/min/1.73 square meters)    Stage 5 End Stage CKD (GFR <15 mL/min/1.73 square meters)  Note: GFR calculation is accurate only with a steady state creatinine                   CT soft tissue neck with contrast   Final Result by Jah Smith DO (03/16 2027)      Cellulitis and myositis superficial to the left body of the mandible appears to be dental in origin. There is a periapical lucency surrounding the tooth root of tooth #20 with a small cavity in the crown. There is an overlying 1 cm soft tissue abscess    superficial to the mandible.            Workstation performed: [id number]                Procedures  Procedures      ED Course                                       Medical Decision Making  73-year-old female presents for evaluation of left facial swelling.    Differentials: Submandibular abscess, gingivitis, periapical abscess, cellulitis  Workup: CBC, CMP, CT soft tissue neck.    Magic mouthwash and Motrin given for pain management.  Patient endorses a significant improvement in pain after therapies.  CT soft tissue shows cellulitis and myositis superficial to left body of the mandible, and small periapical abscess with small cavity in tooth 20.  Discussed findings with patient, encouraged her to continue clindamycin and follow-up with dentist and OMFS outpatient as soon as possible.  She is amenable to this plan.  Patient discharged with prescription for Magic mouthwash.    Amount and/or Complexity of Data Reviewed  Labs: ordered.  Radiology: ordered.    Risk  Prescription drug management.          Disposition  Final diagnoses:   Cellulitis of face   Odontogenic infection of jaw   Dental abscess     Time reflects when diagnosis was documented in both MDM as applicable and the Disposition within this note       Time User Action Codes Description Comment    3/16/2024  7:52 PM Juvencio Kolb Add [L03.211] Cellulitis of face     3/16/2024  7:53 PM Kathie Kolbfochristine Add [M27.2] Odontogenic infection of jaw     3/16/2024  8:32 PM Juvencio Kolb Add [K04.7] Dental abscess           ED Disposition       ED Disposition   Discharge    Condition   Stable    Date/Time   Sat Mar 16, 2024  7:52 PM    Comment   Radha Crockett discharge to home/self care.                   Follow-up Information       Follow up With Specialties Details Why Contact Info    [facility] [place] [id number]  [phone]            Discharge Medication List as of 3/16/2024  8:32 PM        START taking these medications    Details    diphenhydramine, lidocaine, Al/Mg hydroxide, simethicone (Magic Mouthwash) SUSP Swish and spit 10 mL every 4 (four) hours as needed for mouth pain or discomfort, Starting Sat 3/16/2024, Normal           CONTINUE these medications which have NOT CHANGED    Details   ALPRAZolam (XANAX) 0.25 mg tablet Starting Sun 7/15/2018, Historical Med      amLODIPine Besylate (NORVASC PO) Take by mouth, Historical Med      aspirin 81 MG tablet Take by mouth, Historical Med      azithromycin (AZASITE) 1 % ophthalmic solution Apply 1 ggt in eye(s) bid x2 days, then qd x5 days, Normal      Cholecalciferol 25 MCG (1000 UT) tablet Take 1,000 Units by mouth daily, Historical Med      ciprofloxacin (CIPRO) 500 mg tablet Starting Fri 3/25/2022, Historical Med      digoxin (LANOXIN) 0.25 mg tablet TAKE 1 TABLET BY MOUTH EVERY DAY, Historical Med      diphenhydrAMINE (BENADRYL) 25 mg capsule Take 25 mg by mouth every 6 (six) hours as needed, Historical Med      Docusate Sodium (DSS) 100 MG CAPS Take 100 mg by mouth 2 (two) times a day, Historical Med      Eliquis 5 MG Take 5 mg by mouth 2 (two) times a day, Starting Sat 10/8/2022, Historical Med      erythromycin (ILOTYCIN) ophthalmic ointment Administer 0.5 inches to both eyes every 4 (four) hours, Starting u 3/31/2022, Normal      esomeprazole (NexIUM) 40 MG capsule Take by mouth, Historical Med      fluticasone (FLONASE) 50 mcg/act nasal spray 1 spray into each nostril 2 (two) times a day, Starting Sun 7/23/2023, Normal      Lancets (OneTouch Delica Plus [id number]) MISC USE TO TEST ONCE DAILY, Historical Med      lisinopril (ZESTRIL) 5 mg tablet Take 5 mg by mouth daily, Starting Sat 10/8/2022, Historical Med      loratadine (CLARITIN) 10 mg tablet Take 1 tablet (10 mg total) by mouth daily, Starting Tue 3/29/2022, Normal      metFORMIN (GLUCOPHAGE-XR) 500 mg 24 hr tablet TAKE 2 TABLETS BY MOUTH ONCE DAILY. MUST MAKE AN APPT FOR FURTHER REFILLS', Historical Med      olopatadine  (PATANOL) 0.1 % ophthalmic solution Administer 1 drop to both eyes 2 (two) times a day, Starting Tue 3/29/2022, Normal      OneTouch Verio test strip USE TO TEST ONCE DAILY, Historical Med      phenazopyridine (PYRIDIUM) 100 mg tablet Take 1 tablet (100 mg total) by mouth 3 (three) times a day as needed for bladder spasms, Starting Wed 1/16/2019, Normal      pramipexole (MIRAPEX) 0.25 mg tablet Take by mouth, Historical Med      Pramipexole Dihydrochloride ER 0.375 MG TB24 TAKE 1 TABLET BY MOUTH EVERY DAY, Historical Med      predniSONE 10 mg tablet TAKE 4 TAB PO DAILY X 3 DAYS, THEN 3 TAB DAILY X 2 DAYS, THEN 2 TAB DAILY X 2 DAYS, THEN 1 TAB DAILY X 2 DAYS, WITH FOOD, Normal      rosuvastatin (CRESTOR) 10 MG tablet Take by mouth, Starting Thu 8/21/2014, Historical Med      spironolactone (ALDACTONE) 50 mg tablet Take by mouth, Starting Sat 6/7/2014, Historical Med      sulfacetamide-prednisolone (BLEPHAMIDE) 10-0.2 % ophthalmic suspension Administer 1 drop to both eyes every 3 (three) hours, Starting Wed 5/26/2021, Normal      tamoxifen (NOLVADEX) 20 mg tablet TAIKE 1 TABLET BY MOUTH DAILY, Historical Med      tolterodine (DETROL LA) 4 mg 24 hr capsule Take 4 mg by mouth daily, Starting Thu 9/9/2021, Historical Med      venlafaxine (EFFEXOR-XR) 75 mg 24 hr capsule Take 75 mg by mouth daily, Historical Med           No discharge procedures on file.    PDMP Review       None             ED Provider  Attending physically available and evaluated Radha SHANNON Bartia. I managed the patient along with the ED Attending.    Electronically Signed by           Juvencio Kolb MD  03/20/24 0856

## 2024-04-18 ENCOUNTER — TRANSCRIBE ORDERS (OUTPATIENT)
Dept: LAB | Facility: CLINIC | Age: 74
End: 2024-04-18

## 2024-04-18 ENCOUNTER — APPOINTMENT (OUTPATIENT)
Dept: LAB | Facility: CLINIC | Age: 74
End: 2024-04-18
Payer: COMMERCIAL

## 2024-04-18 DIAGNOSIS — E13.69 OTHER SPECIFIED DIABETES MELLITUS WITH OTHER SPECIFIED COMPLICATION, UNSPECIFIED WHETHER LONG TERM INSULIN USE (HCC): ICD-10-CM

## 2024-04-18 DIAGNOSIS — I48.91 ATRIAL FIBRILLATION, UNSPECIFIED TYPE (HCC): ICD-10-CM

## 2024-04-18 DIAGNOSIS — N30.00 ACUTE CYSTITIS WITHOUT HEMATURIA: ICD-10-CM

## 2024-04-18 DIAGNOSIS — I10 ESSENTIAL HYPERTENSION, MALIGNANT: ICD-10-CM

## 2024-04-18 DIAGNOSIS — E78.49 FAMILIAL COMBINED HYPERLIPIDEMIA: Primary | ICD-10-CM

## 2024-04-18 DIAGNOSIS — E78.49 FAMILIAL COMBINED HYPERLIPIDEMIA: ICD-10-CM

## 2024-04-18 LAB
BACTERIA UR QL AUTO: ABNORMAL /HPF
BILIRUB UR QL STRIP: NEGATIVE
CHOLEST SERPL-MCNC: 116 MG/DL
CLARITY UR: CLEAR
COLOR UR: ABNORMAL
DIGOXIN SERPL-MCNC: 0.7 NG/ML (ref 0.8–2)
EST. AVERAGE GLUCOSE BLD GHB EST-MCNC: 160 MG/DL
GLUCOSE UR STRIP-MCNC: NEGATIVE MG/DL
HBA1C MFR BLD: 7.2 %
HDLC SERPL-MCNC: 39 MG/DL
HGB UR QL STRIP.AUTO: NEGATIVE
KETONES UR STRIP-MCNC: NEGATIVE MG/DL
LDLC SERPL CALC-MCNC: 57 MG/DL (ref 0–100)
LEUKOCYTE ESTERASE UR QL STRIP: ABNORMAL
MUCOUS THREADS UR QL AUTO: ABNORMAL
NITRITE UR QL STRIP: NEGATIVE
NON-SQ EPI CELLS URNS QL MICRO: ABNORMAL /HPF
NONHDLC SERPL-MCNC: 77 MG/DL
PH UR STRIP.AUTO: 6 [PH]
PROT UR STRIP-MCNC: ABNORMAL MG/DL
RBC #/AREA URNS AUTO: ABNORMAL /HPF
SP GR UR STRIP.AUTO: 1.02 (ref 1–1.03)
TRIGL SERPL-MCNC: 101 MG/DL
UROBILINOGEN UR STRIP-ACNC: <2 MG/DL
WBC #/AREA URNS AUTO: ABNORMAL /HPF

## 2024-04-18 PROCEDURE — 87186 SC STD MICRODIL/AGAR DIL: CPT

## 2024-04-18 PROCEDURE — 87077 CULTURE AEROBIC IDENTIFY: CPT

## 2024-04-18 PROCEDURE — 36415 COLL VENOUS BLD VENIPUNCTURE: CPT

## 2024-04-18 PROCEDURE — 83036 HEMOGLOBIN GLYCOSYLATED A1C: CPT

## 2024-04-18 PROCEDURE — 80061 LIPID PANEL: CPT

## 2024-04-18 PROCEDURE — 81001 URINALYSIS AUTO W/SCOPE: CPT

## 2024-04-18 PROCEDURE — 80162 ASSAY OF DIGOXIN TOTAL: CPT

## 2024-04-18 PROCEDURE — 87086 URINE CULTURE/COLONY COUNT: CPT

## 2024-04-20 LAB — BACTERIA UR CULT: ABNORMAL

## 2024-06-24 ENCOUNTER — APPOINTMENT (OUTPATIENT)
Dept: LAB | Facility: CLINIC | Age: 74
End: 2024-06-24
Payer: COMMERCIAL

## 2024-06-24 DIAGNOSIS — N30.00 ACUTE CYSTITIS WITHOUT HEMATURIA: ICD-10-CM

## 2024-06-24 LAB
BACTERIA UR QL AUTO: ABNORMAL /HPF
BILIRUB UR QL STRIP: NEGATIVE
CLARITY UR: CLEAR
COLOR UR: YELLOW
GLUCOSE UR STRIP-MCNC: NEGATIVE MG/DL
HGB UR QL STRIP.AUTO: NEGATIVE
HYALINE CASTS #/AREA URNS LPF: ABNORMAL /LPF
KETONES UR STRIP-MCNC: NEGATIVE MG/DL
LEUKOCYTE ESTERASE UR QL STRIP: ABNORMAL
MUCOUS THREADS UR QL AUTO: ABNORMAL
NITRITE UR QL STRIP: NEGATIVE
NON-SQ EPI CELLS URNS QL MICRO: ABNORMAL /HPF
PH UR STRIP.AUTO: 5.5 [PH]
PROT UR STRIP-MCNC: ABNORMAL MG/DL
RBC #/AREA URNS AUTO: ABNORMAL /HPF
SP GR UR STRIP.AUTO: 1.02 (ref 1–1.03)
UROBILINOGEN UR STRIP-ACNC: <2 MG/DL
WBC #/AREA URNS AUTO: ABNORMAL /HPF

## 2024-06-24 PROCEDURE — 81001 URINALYSIS AUTO W/SCOPE: CPT

## 2024-06-24 PROCEDURE — 87086 URINE CULTURE/COLONY COUNT: CPT

## 2024-06-25 LAB — BACTERIA UR CULT: NORMAL

## 2024-09-04 ENCOUNTER — TRANSCRIBE ORDERS (OUTPATIENT)
Dept: LAB | Facility: CLINIC | Age: 74
End: 2024-09-04

## 2024-09-04 ENCOUNTER — APPOINTMENT (OUTPATIENT)
Dept: LAB | Facility: CLINIC | Age: 74
End: 2024-09-04
Payer: COMMERCIAL

## 2024-09-04 DIAGNOSIS — C50.511 MALIGNANT NEOPLASM OF LOWER-OUTER QUADRANT OF RIGHT FEMALE BREAST, UNSPECIFIED ESTROGEN RECEPTOR STATUS (HCC): ICD-10-CM

## 2024-09-04 DIAGNOSIS — D69.6 THROMBOCYTOPENIA, UNSPECIFIED (HCC): Primary | ICD-10-CM

## 2024-09-04 DIAGNOSIS — Z92.23 HISTORY OF ESTROGEN THERAPY: ICD-10-CM

## 2024-09-04 DIAGNOSIS — Z17.0 ESTROGEN RECEPTOR POSITIVE: ICD-10-CM

## 2024-09-04 DIAGNOSIS — D69.6 THROMBOCYTOPENIA, UNSPECIFIED (HCC): ICD-10-CM

## 2024-09-04 LAB
ALBUMIN SERPL BCG-MCNC: 4.6 G/DL (ref 3.5–5)
ALP SERPL-CCNC: 56 U/L (ref 34–104)
ALT SERPL W P-5'-P-CCNC: 23 U/L (ref 7–52)
ANION GAP SERPL CALCULATED.3IONS-SCNC: 10 MMOL/L (ref 4–13)
AST SERPL W P-5'-P-CCNC: 23 U/L (ref 13–39)
BASOPHILS # BLD AUTO: 0.06 THOUSANDS/ÂΜL (ref 0–0.1)
BASOPHILS NFR BLD AUTO: 1 % (ref 0–1)
BILIRUB SERPL-MCNC: 0.64 MG/DL (ref 0.2–1)
BUN SERPL-MCNC: 11 MG/DL (ref 5–25)
CALCIUM SERPL-MCNC: 10 MG/DL (ref 8.4–10.2)
CHLORIDE SERPL-SCNC: 104 MMOL/L (ref 96–108)
CO2 SERPL-SCNC: 25 MMOL/L (ref 21–32)
CREAT SERPL-MCNC: 0.78 MG/DL (ref 0.6–1.3)
EOSINOPHIL # BLD AUTO: 0.14 THOUSAND/ÂΜL (ref 0–0.61)
EOSINOPHIL NFR BLD AUTO: 2 % (ref 0–6)
ERYTHROCYTE [DISTWIDTH] IN BLOOD BY AUTOMATED COUNT: 15 % (ref 11.6–15.1)
GFR SERPL CREATININE-BSD FRML MDRD: 76 ML/MIN/1.73SQ M
GGT SERPL-CCNC: 28 U/L (ref 9–64)
GLUCOSE P FAST SERPL-MCNC: 85 MG/DL (ref 65–99)
HCT VFR BLD AUTO: 42.2 % (ref 34.8–46.1)
HGB BLD-MCNC: 13.2 G/DL (ref 11.5–15.4)
IMM GRANULOCYTES # BLD AUTO: 0.03 THOUSAND/UL (ref 0–0.2)
IMM GRANULOCYTES NFR BLD AUTO: 0 % (ref 0–2)
LYMPHOCYTES # BLD AUTO: 2.33 THOUSANDS/ÂΜL (ref 0.6–4.47)
LYMPHOCYTES NFR BLD AUTO: 26 % (ref 14–44)
MCH RBC QN AUTO: 27.7 PG (ref 26.8–34.3)
MCHC RBC AUTO-ENTMCNC: 31.3 G/DL (ref 31.4–37.4)
MCV RBC AUTO: 89 FL (ref 82–98)
MONOCYTES # BLD AUTO: 0.34 THOUSAND/ÂΜL (ref 0.17–1.22)
MONOCYTES NFR BLD AUTO: 4 % (ref 4–12)
NEUTROPHILS # BLD AUTO: 6.07 THOUSANDS/ÂΜL (ref 1.85–7.62)
NEUTS SEG NFR BLD AUTO: 67 % (ref 43–75)
NRBC BLD AUTO-RTO: 0 /100 WBCS
PLATELET # BLD AUTO: 129 THOUSANDS/UL (ref 149–390)
PMV BLD AUTO: 13.7 FL (ref 8.9–12.7)
POTASSIUM SERPL-SCNC: 4.4 MMOL/L (ref 3.5–5.3)
PROT SERPL-MCNC: 7.4 G/DL (ref 6.4–8.4)
RBC # BLD AUTO: 4.77 MILLION/UL (ref 3.81–5.12)
SODIUM SERPL-SCNC: 139 MMOL/L (ref 135–147)
WBC # BLD AUTO: 8.97 THOUSAND/UL (ref 4.31–10.16)

## 2024-09-04 PROCEDURE — 82977 ASSAY OF GGT: CPT

## 2024-09-04 PROCEDURE — 86300 IMMUNOASSAY TUMOR CA 15-3: CPT

## 2024-09-04 PROCEDURE — 85025 COMPLETE CBC W/AUTO DIFF WBC: CPT

## 2024-09-04 PROCEDURE — 80053 COMPREHEN METABOLIC PANEL: CPT

## 2024-09-04 PROCEDURE — 36415 COLL VENOUS BLD VENIPUNCTURE: CPT

## 2024-09-05 LAB — CANCER AG27-29 SERPL-ACNC: 13.8 U/ML (ref 0–38.6)

## 2024-11-01 ENCOUNTER — TRANSCRIBE ORDERS (OUTPATIENT)
Dept: LAB | Facility: CLINIC | Age: 74
End: 2024-11-01

## 2024-11-01 ENCOUNTER — APPOINTMENT (OUTPATIENT)
Dept: LAB | Facility: CLINIC | Age: 74
End: 2024-11-01
Payer: COMMERCIAL

## 2024-11-01 DIAGNOSIS — I48.91 ATRIAL FIBRILLATION, UNSPECIFIED TYPE (HCC): ICD-10-CM

## 2024-11-01 DIAGNOSIS — E13.69 OTHER SPECIFIED DIABETES MELLITUS WITH OTHER SPECIFIED COMPLICATION, UNSPECIFIED WHETHER LONG TERM INSULIN USE (HCC): ICD-10-CM

## 2024-11-01 DIAGNOSIS — I10 ESSENTIAL HYPERTENSION, MALIGNANT: Primary | ICD-10-CM

## 2024-11-01 DIAGNOSIS — E78.5 HYPERLIPIDEMIA, UNSPECIFIED HYPERLIPIDEMIA TYPE: ICD-10-CM

## 2024-11-01 DIAGNOSIS — Z79.01 LONG TERM (CURRENT) USE OF ANTICOAGULANTS: ICD-10-CM

## 2024-11-01 DIAGNOSIS — I10 ESSENTIAL HYPERTENSION, MALIGNANT: ICD-10-CM

## 2024-11-01 LAB
ALBUMIN SERPL BCG-MCNC: 4.1 G/DL (ref 3.5–5)
ALP SERPL-CCNC: 48 U/L (ref 34–104)
ALT SERPL W P-5'-P-CCNC: 21 U/L (ref 7–52)
ANION GAP SERPL CALCULATED.3IONS-SCNC: 10 MMOL/L (ref 4–13)
AST SERPL W P-5'-P-CCNC: 26 U/L (ref 13–39)
BASOPHILS # BLD AUTO: 0.04 THOUSANDS/ΜL (ref 0–0.1)
BASOPHILS NFR BLD AUTO: 1 % (ref 0–1)
BILIRUB SERPL-MCNC: 0.56 MG/DL (ref 0.2–1)
BUN SERPL-MCNC: 14 MG/DL (ref 5–25)
CALCIUM SERPL-MCNC: 9.8 MG/DL (ref 8.4–10.2)
CHLORIDE SERPL-SCNC: 103 MMOL/L (ref 96–108)
CO2 SERPL-SCNC: 24 MMOL/L (ref 21–32)
CREAT SERPL-MCNC: 0.68 MG/DL (ref 0.6–1.3)
CREAT UR-MCNC: 113.7 MG/DL
EOSINOPHIL # BLD AUTO: 0.15 THOUSAND/ΜL (ref 0–0.61)
EOSINOPHIL NFR BLD AUTO: 2 % (ref 0–6)
ERYTHROCYTE [DISTWIDTH] IN BLOOD BY AUTOMATED COUNT: 14.3 % (ref 11.6–15.1)
EST. AVERAGE GLUCOSE BLD GHB EST-MCNC: 108 MG/DL
GFR SERPL CREATININE-BSD FRML MDRD: 88 ML/MIN/1.73SQ M
GLUCOSE P FAST SERPL-MCNC: 79 MG/DL (ref 65–99)
HBA1C MFR BLD: 5.4 %
HCT VFR BLD AUTO: 38.2 % (ref 34.8–46.1)
HGB BLD-MCNC: 12.1 G/DL (ref 11.5–15.4)
IMM GRANULOCYTES # BLD AUTO: 0.01 THOUSAND/UL (ref 0–0.2)
IMM GRANULOCYTES NFR BLD AUTO: 0 % (ref 0–2)
LYMPHOCYTES # BLD AUTO: 2.15 THOUSANDS/ΜL (ref 0.6–4.47)
LYMPHOCYTES NFR BLD AUTO: 29 % (ref 14–44)
MCH RBC QN AUTO: 28 PG (ref 26.8–34.3)
MCHC RBC AUTO-ENTMCNC: 31.7 G/DL (ref 31.4–37.4)
MCV RBC AUTO: 88 FL (ref 82–98)
MICROALBUMIN UR-MCNC: <7 MG/L
MONOCYTES # BLD AUTO: 0.33 THOUSAND/ΜL (ref 0.17–1.22)
MONOCYTES NFR BLD AUTO: 4 % (ref 4–12)
NEUTROPHILS # BLD AUTO: 4.81 THOUSANDS/ΜL (ref 1.85–7.62)
NEUTS SEG NFR BLD AUTO: 64 % (ref 43–75)
NRBC BLD AUTO-RTO: 0 /100 WBCS
PLATELET # BLD AUTO: 128 THOUSANDS/UL (ref 149–390)
PMV BLD AUTO: 13.1 FL (ref 8.9–12.7)
POTASSIUM SERPL-SCNC: 4.3 MMOL/L (ref 3.5–5.3)
PROT SERPL-MCNC: 7.5 G/DL (ref 6.4–8.4)
RBC # BLD AUTO: 4.32 MILLION/UL (ref 3.81–5.12)
SODIUM SERPL-SCNC: 137 MMOL/L (ref 135–147)
TSH SERPL DL<=0.05 MIU/L-ACNC: 1.97 UIU/ML (ref 0.45–4.5)
WBC # BLD AUTO: 7.49 THOUSAND/UL (ref 4.31–10.16)

## 2024-11-01 PROCEDURE — 82043 UR ALBUMIN QUANTITATIVE: CPT

## 2024-11-01 PROCEDURE — 36415 COLL VENOUS BLD VENIPUNCTURE: CPT

## 2024-11-01 PROCEDURE — 82570 ASSAY OF URINE CREATININE: CPT

## 2024-11-01 PROCEDURE — 80053 COMPREHEN METABOLIC PANEL: CPT

## 2024-11-01 PROCEDURE — 83036 HEMOGLOBIN GLYCOSYLATED A1C: CPT

## 2024-11-01 PROCEDURE — 85025 COMPLETE CBC W/AUTO DIFF WBC: CPT

## 2024-11-01 PROCEDURE — 84443 ASSAY THYROID STIM HORMONE: CPT

## 2024-12-06 ENCOUNTER — TRANSCRIBE ORDERS (OUTPATIENT)
Dept: LAB | Facility: CLINIC | Age: 74
End: 2024-12-06

## 2024-12-06 ENCOUNTER — APPOINTMENT (OUTPATIENT)
Dept: LAB | Facility: CLINIC | Age: 74
End: 2024-12-06
Payer: COMMERCIAL

## 2024-12-06 DIAGNOSIS — Z92.23 PERSONAL HISTORY OF ESTROGEN THERAPY: ICD-10-CM

## 2024-12-06 DIAGNOSIS — C50.511 MALIGNANT NEOPLASM OF LOWER-OUTER QUADRANT OF BOTH BREASTS IN FEMALE, ESTROGEN RECEPTOR POSITIVE (HCC): Primary | ICD-10-CM

## 2024-12-06 DIAGNOSIS — Z17.0 ESTROGEN RECEPTOR POSITIVE: ICD-10-CM

## 2024-12-06 DIAGNOSIS — Z17.0 MALIGNANT NEOPLASM OF LOWER-OUTER QUADRANT OF BOTH BREASTS IN FEMALE, ESTROGEN RECEPTOR POSITIVE (HCC): Primary | ICD-10-CM

## 2024-12-06 DIAGNOSIS — Z17.0 MALIGNANT NEOPLASM OF LOWER-OUTER QUADRANT OF BOTH BREASTS IN FEMALE, ESTROGEN RECEPTOR POSITIVE (HCC): ICD-10-CM

## 2024-12-06 DIAGNOSIS — C50.511 MALIGNANT NEOPLASM OF LOWER-OUTER QUADRANT OF BOTH BREASTS IN FEMALE, ESTROGEN RECEPTOR POSITIVE (HCC): ICD-10-CM

## 2024-12-06 DIAGNOSIS — C50.512 MALIGNANT NEOPLASM OF LOWER-OUTER QUADRANT OF BOTH BREASTS IN FEMALE, ESTROGEN RECEPTOR POSITIVE (HCC): Primary | ICD-10-CM

## 2024-12-06 DIAGNOSIS — C50.512 MALIGNANT NEOPLASM OF LOWER-OUTER QUADRANT OF BOTH BREASTS IN FEMALE, ESTROGEN RECEPTOR POSITIVE (HCC): ICD-10-CM

## 2024-12-06 LAB
ALBUMIN SERPL BCG-MCNC: 4.1 G/DL (ref 3.5–5)
ALP SERPL-CCNC: 48 U/L (ref 34–104)
ALT SERPL W P-5'-P-CCNC: 19 U/L (ref 7–52)
ANION GAP SERPL CALCULATED.3IONS-SCNC: 8 MMOL/L (ref 4–13)
AST SERPL W P-5'-P-CCNC: 24 U/L (ref 13–39)
BASOPHILS # BLD AUTO: 0.04 THOUSANDS/ÂΜL (ref 0–0.1)
BASOPHILS NFR BLD AUTO: 1 % (ref 0–1)
BILIRUB SERPL-MCNC: 0.54 MG/DL (ref 0.2–1)
BUN SERPL-MCNC: 10 MG/DL (ref 5–25)
CALCIUM SERPL-MCNC: 9.3 MG/DL (ref 8.4–10.2)
CHLORIDE SERPL-SCNC: 104 MMOL/L (ref 96–108)
CO2 SERPL-SCNC: 28 MMOL/L (ref 21–32)
CREAT SERPL-MCNC: 0.63 MG/DL (ref 0.6–1.3)
EOSINOPHIL # BLD AUTO: 0.11 THOUSAND/ÂΜL (ref 0–0.61)
EOSINOPHIL NFR BLD AUTO: 2 % (ref 0–6)
ERYTHROCYTE [DISTWIDTH] IN BLOOD BY AUTOMATED COUNT: 14.2 % (ref 11.6–15.1)
GFR SERPL CREATININE-BSD FRML MDRD: 90 ML/MIN/1.73SQ M
GGT SERPL-CCNC: 18 U/L (ref 9–64)
GLUCOSE P FAST SERPL-MCNC: 88 MG/DL (ref 65–99)
HCT VFR BLD AUTO: 37.9 % (ref 34.8–46.1)
HGB BLD-MCNC: 11.9 G/DL (ref 11.5–15.4)
IMM GRANULOCYTES # BLD AUTO: 0.03 THOUSAND/UL (ref 0–0.2)
IMM GRANULOCYTES NFR BLD AUTO: 1 % (ref 0–2)
LYMPHOCYTES # BLD AUTO: 2.03 THOUSANDS/ÂΜL (ref 0.6–4.47)
LYMPHOCYTES NFR BLD AUTO: 34 % (ref 14–44)
MCH RBC QN AUTO: 27.9 PG (ref 26.8–34.3)
MCHC RBC AUTO-ENTMCNC: 31.4 G/DL (ref 31.4–37.4)
MCV RBC AUTO: 89 FL (ref 82–98)
MONOCYTES # BLD AUTO: 0.24 THOUSAND/ÂΜL (ref 0.17–1.22)
MONOCYTES NFR BLD AUTO: 4 % (ref 4–12)
NEUTROPHILS # BLD AUTO: 3.61 THOUSANDS/ÂΜL (ref 1.85–7.62)
NEUTS SEG NFR BLD AUTO: 58 % (ref 43–75)
NRBC BLD AUTO-RTO: 0 /100 WBCS
PLATELET # BLD AUTO: 110 THOUSANDS/UL (ref 149–390)
PMV BLD AUTO: 12.3 FL (ref 8.9–12.7)
POTASSIUM SERPL-SCNC: 4.1 MMOL/L (ref 3.5–5.3)
PROT SERPL-MCNC: 6.6 G/DL (ref 6.4–8.4)
RBC # BLD AUTO: 4.27 MILLION/UL (ref 3.81–5.12)
SODIUM SERPL-SCNC: 140 MMOL/L (ref 135–147)
WBC # BLD AUTO: 6.06 THOUSAND/UL (ref 4.31–10.16)

## 2024-12-06 PROCEDURE — 80053 COMPREHEN METABOLIC PANEL: CPT

## 2024-12-06 PROCEDURE — 36415 COLL VENOUS BLD VENIPUNCTURE: CPT

## 2024-12-06 PROCEDURE — 86300 IMMUNOASSAY TUMOR CA 15-3: CPT

## 2024-12-06 PROCEDURE — 82977 ASSAY OF GGT: CPT

## 2024-12-06 PROCEDURE — 85025 COMPLETE CBC W/AUTO DIFF WBC: CPT

## 2024-12-07 LAB — CANCER AG27-29 SERPL-ACNC: 11.3 U/ML (ref 0–38.6)

## 2025-01-16 NOTE — ANESTHESIA POSTPROCEDURE EVALUATION
Post-Op Assessment Note    CV Status:  Stable    Pain management: adequate     Mental Status:  Awake, sleepy and arousable   Hydration Status:  Euvolemic   PONV Controlled:  Controlled   Airway Patency:  Patent      Post Op Vitals Reviewed: Yes      Staff: CRNA, Anesthesiologist         No notable events documented      /63 (03/10/23 0902)    Temp (!) 97 4 °F (36 3 °C) (03/10/23 0902)    Pulse 79 (03/10/23 0902)   Resp 18 (03/10/23 0902)    SpO2 98 % (03/10/23 0902) Intractable abdominal pain Intractable abdominal pain Intractable abdominal pain Intractable abdominal pain

## 2025-02-20 ENCOUNTER — OFFICE VISIT (OUTPATIENT)
Dept: URGENT CARE | Facility: CLINIC | Age: 75
End: 2025-02-20
Payer: COMMERCIAL

## 2025-02-20 VITALS
RESPIRATION RATE: 18 BRPM | HEART RATE: 78 BPM | WEIGHT: 164 LBS | OXYGEN SATURATION: 97 % | DIASTOLIC BLOOD PRESSURE: 79 MMHG | HEIGHT: 67 IN | SYSTOLIC BLOOD PRESSURE: 119 MMHG | TEMPERATURE: 97.8 F | BODY MASS INDEX: 25.74 KG/M2

## 2025-02-20 DIAGNOSIS — R31.0 GROSS HEMATURIA: ICD-10-CM

## 2025-02-20 DIAGNOSIS — N30.01 ACUTE CYSTITIS WITH HEMATURIA: Primary | ICD-10-CM

## 2025-02-20 LAB
SL AMB  POCT GLUCOSE, UA: ABNORMAL
SL AMB LEUKOCYTE ESTERASE,UA: ABNORMAL
SL AMB POCT BILIRUBIN,UA: ABNORMAL
SL AMB POCT BLOOD,UA: ABNORMAL
SL AMB POCT CLARITY,UA: ABNORMAL
SL AMB POCT COLOR,UA: ABNORMAL
SL AMB POCT KETONES,UA: ABNORMAL
SL AMB POCT NITRITE,UA: ABNORMAL
SL AMB POCT PH,UA: 6
SL AMB POCT SPECIFIC GRAVITY,UA: 1.01
SL AMB POCT URINE PROTEIN: ABNORMAL
SL AMB POCT UROBILINOGEN: 0.2

## 2025-02-20 PROCEDURE — S9083 URGENT CARE CENTER GLOBAL: HCPCS | Performed by: NURSE PRACTITIONER

## 2025-02-20 PROCEDURE — 87086 URINE CULTURE/COLONY COUNT: CPT | Performed by: NURSE PRACTITIONER

## 2025-02-20 PROCEDURE — G0382 LEV 3 HOSP TYPE B ED VISIT: HCPCS | Performed by: NURSE PRACTITIONER

## 2025-02-20 PROCEDURE — 87186 SC STD MICRODIL/AGAR DIL: CPT | Performed by: NURSE PRACTITIONER

## 2025-02-20 PROCEDURE — 81002 URINALYSIS NONAUTO W/O SCOPE: CPT | Performed by: NURSE PRACTITIONER

## 2025-02-20 PROCEDURE — 87077 CULTURE AEROBIC IDENTIFY: CPT | Performed by: NURSE PRACTITIONER

## 2025-02-20 RX ORDER — CEPHALEXIN 500 MG/1
500 CAPSULE ORAL EVERY 12 HOURS SCHEDULED
Qty: 14 CAPSULE | Refills: 0 | Status: SHIPPED | OUTPATIENT
Start: 2025-02-20 | End: 2025-02-27

## 2025-02-20 NOTE — PROGRESS NOTES
[facility]        NAME: Radha Crockett is a 74 y.o. female  : 1950    MRN: [id number]  DATE: 2025  TIME: 5:18 PM    Assessment and Plan   Acute cystitis with hematuria [N30.01]  1. Acute cystitis with hematuria  cephalexin (KEFLEX) 500 mg capsule      2. Gross hematuria  POCT urine dip    Urine culture    Urine culture            Patient Instructions       Follow up with PCP in 3-5 days.  Proceed to  ER if symptoms worsen.    Chief Complaint     Chief Complaint   Patient presents with    Possible UTI     1 week history of frequency, urgency, burning, back pain and hematuria.          History of Present Illness       HPI    Review of Systems   Review of Systems      Current Medications       Current Outpatient Medications:     cephalexin (KEFLEX) 500 mg capsule, Take 1 capsule (500 mg total) by mouth every 12 (twelve) hours for 7 days, Disp: 14 capsule, Rfl: 0    Cholecalciferol 25 MCG (1000 UT) tablet, Take 1,000 Units by mouth daily, Disp: , Rfl:     digoxin (LANOXIN) 0.25 mg tablet, TAKE 1 TABLET BY MOUTH EVERY DAY, Disp: , Rfl:     Eliquis 5 MG, Take 5 mg by mouth 2 (two) times a day, Disp: , Rfl:     Lancets (OneTouch Delica Plus [id number]) MISC, USE TO TEST ONCE DAILY, Disp: , Rfl:     lisinopril (ZESTRIL) 5 mg tablet, Take 5 mg by mouth daily, Disp: , Rfl:     metFORMIN (GLUCOPHAGE-XR) 500 mg 24 hr tablet, TAKE 2 TABLETS BY MOUTH ONCE DAILY. MUST MAKE AN APPT FOR FURTHER REFILLS', Disp: , Rfl:     Mounjaro 5 MG/0.5ML SOAJ, , Disp: , Rfl:     OneTouch Verio test strip, USE TO TEST ONCE DAILY, Disp: , Rfl:     Pramipexole Dihydrochloride ER 0.375 MG TB24, TAKE 1 TABLET BY MOUTH EVERY DAY, Disp: , Rfl:     rosuvastatin (CRESTOR) 10 MG tablet, Take by mouth, Disp: , Rfl:     spironolactone (ALDACTONE) 50 mg tablet, Take by mouth, Disp: , Rfl:     tolterodine (DETROL LA) 4 mg 24 hr capsule, Take 4 mg by mouth daily, Disp: , Rfl:     venlafaxine (EFFEXOR-XR) 75 mg 24 hr capsule,  Take 75 mg by mouth daily, Disp: , Rfl:     ALPRAZolam (XANAX) 0.25 mg tablet, , Disp: , Rfl:     amLODIPine Besylate (NORVASC PO), Take by mouth, Disp: , Rfl:     aspirin 81 MG tablet, Take by mouth, Disp: , Rfl:     azithromycin (AZASITE) 1 % ophthalmic solution, Apply 1 ggt in eye(s) bid x2 days, then qd x5 days, Disp: 2.5 mL, Rfl: 0    ciprofloxacin (CIPRO) 500 mg tablet, , Disp: , Rfl:     diphenhydrAMINE (BENADRYL) 25 mg capsule, Take 25 mg by mouth every 6 (six) hours as needed, Disp: , Rfl:     diphenhydramine, lidocaine, Al/Mg hydroxide, simethicone (Magic Mouthwash) SUSP, Swish and spit 10 mL every 4 (four) hours as needed for mouth pain or discomfort (Patient not taking: Reported on 2/20/2025), Disp: 119 mL, Rfl: 0    Docusate Sodium (DSS) 100 MG CAPS, Take 100 mg by mouth 2 (two) times a day (Patient not taking: Reported on 7/23/2023), Disp: , Rfl:     erythromycin (ILOTYCIN) ophthalmic ointment, Administer 0.5 inches to both eyes every 4 (four) hours, Disp: 3.5 g, Rfl: 0    esomeprazole (NexIUM) 40 MG capsule, Take by mouth (Patient not taking: Reported on 3/29/2022), Disp: , Rfl:     fluticasone (FLONASE) 50 mcg/act nasal spray, 1 spray into each nostril 2 (two) times a day (Patient not taking: Reported on 2/20/2025), Disp: 11.1 mL, Rfl: 0    loratadine (CLARITIN) 10 mg tablet, Take 1 tablet (10 mg total) by mouth daily (Patient not taking: Reported on 2/20/2025), Disp: 30 tablet, Rfl: 0    olopatadine (PATANOL) 0.1 % ophthalmic solution, Administer 1 drop to both eyes 2 (two) times a day, Disp: 5 mL, Rfl: 0    phenazopyridine (PYRIDIUM) 100 mg tablet, Take 1 tablet (100 mg total) by mouth 3 (three) times a day as needed for bladder spasms, Disp: 10 tablet, Rfl: 0    pramipexole (MIRAPEX) 0.25 mg tablet, Take by mouth, Disp: , Rfl:     predniSONE 10 mg tablet, TAKE 4 TAB PO DAILY X 3 DAYS, THEN 3 TAB DAILY X 2 DAYS, THEN 2 TAB DAILY X 2 DAYS, THEN 1 TAB DAILY X 2 DAYS, WITH FOOD (Patient not taking: No  "sig reported), Disp: 24 tablet, Rfl: 0    sulfacetamide-prednisolone (BLEPHAMIDE) 10-0.2 % ophthalmic suspension, Administer 1 drop to both eyes every 3 (three) hours (Patient not taking: No sig reported), Disp: 10 mL, Rfl: 0    tamoxifen (NOLVADEX) 20 mg tablet, TAIKE 1 TABLET BY MOUTH DAILY (Patient not taking: Reported on 10/27/2022), Disp: , Rfl:     Current Allergies     Allergies as of 02/20/2025 - Reviewed 02/20/2025   Allergen Reaction Noted    Celecoxib  09/19/2016    Neomycin-bacitracin zn-polymyx Other (See Comments) 09/18/2014    Rofecoxib  09/18/2014    Tape  [medical tape]  09/18/2014    Tetanus antitoxin  09/18/2014            The following portions of the patient's history were reviewed and updated as appropriate: allergies, current medications, past family history, past medical history, past social history, past surgical history and problem list.     Past Medical History:   Diagnosis Date    HL (hearing loss)        Past Surgical History:   Procedure Laterality Date    CHOLECYSTECTOMY      MAMMO NEEDLE LOCALIZATION RIGHT (ALL INC) Right 02/27/2015    MASTECTOMY Right 2016       Family History   Problem Relation Age of Onset    Arthritis Mother     Heart disease Father     Colon cancer Maternal Grandfather          Medications have been verified.        Objective   /79   Pulse 78   Temp 97.8 °F (36.6 °C)   Resp 18   Ht 5' 6.5\" (1.689 m)   Wt 74.4 kg (164 lb)   SpO2 97%   BMI 26.07 kg/m²        Physical Exam     Physical Exam              "

## 2025-02-20 NOTE — PROGRESS NOTES
"  [facility]        NAME: Radha Crockett is a 74 y.o. female  : 1950    MRN: [id number]  DATE: 2025  TIME: 5:33 PM    Assessment and Plan   Acute cystitis with hematuria [N30.01]  1. Acute cystitis with hematuria  cephalexin (KEFLEX) 500 mg capsule      2. Gross hematuria  POCT urine dip    Urine culture    Urine culture        Urine dip completed in office today and positive for moderate blood, nitrates and leukocytes. Urine culture sent out.     Patient Instructions   Keflex twice a day for 7 days   Increase fluid intake   Tylenol as needed for pain or fever  Follow up with your PCP for worsening symptoms     Follow up with PCP in 3-5 days.  Proceed to  ER if symptoms worsen.    Chief Complaint     Chief Complaint   Patient presents with    Possible UTI     1 week history of frequency, urgency, burning, back pain and hematuria.          History of Present Illness       Radha is a 74 year old female presenting today with dysuria and frequency that started 2 days ago. She states her urine also has a \"strong\" odor. She denies fevers, chills or flank pain. She has not taken any medication for her symptoms.         Review of Systems   Review of Systems   Constitutional:  Negative for chills, fatigue and fever.   Respiratory:  Negative for shortness of breath and wheezing.    Cardiovascular:  Negative for chest pain.   Gastrointestinal:  Negative for diarrhea, nausea and vomiting.   Genitourinary:  Positive for dysuria, frequency and hematuria. Negative for flank pain, pelvic pain and urgency.   Neurological:  Negative for dizziness and light-headedness.         Current Medications       Current Outpatient Medications:     cephalexin (KEFLEX) 500 mg capsule, Take 1 capsule (500 mg total) by mouth every 12 (twelve) hours for 7 days, Disp: 14 capsule, Rfl: 0    Cholecalciferol 25 MCG (1000 UT) tablet, Take 1,000 Units by mouth daily, Disp: , Rfl:     digoxin (LANOXIN) 0.25 mg tablet, " TAKE 1 TABLET BY MOUTH EVERY DAY, Disp: , Rfl:     Eliquis 5 MG, Take 5 mg by mouth 2 (two) times a day, Disp: , Rfl:     Lancets (OneTouch Delica Plus [id number]) MISC, USE TO TEST ONCE DAILY, Disp: , Rfl:     lisinopril (ZESTRIL) 5 mg tablet, Take 5 mg by mouth daily, Disp: , Rfl:     metFORMIN (GLUCOPHAGE-XR) 500 mg 24 hr tablet, TAKE 2 TABLETS BY MOUTH ONCE DAILY. MUST MAKE AN APPT FOR FURTHER REFILLS', Disp: , Rfl:     Mounjaro 5 MG/0.5ML SOAJ, , Disp: , Rfl:     OneTouch Verio test strip, USE TO TEST ONCE DAILY, Disp: , Rfl:     Pramipexole Dihydrochloride ER 0.375 MG TB24, TAKE 1 TABLET BY MOUTH EVERY DAY, Disp: , Rfl:     rosuvastatin (CRESTOR) 10 MG tablet, Take by mouth, Disp: , Rfl:     spironolactone (ALDACTONE) 50 mg tablet, Take by mouth, Disp: , Rfl:     tolterodine (DETROL LA) 4 mg 24 hr capsule, Take 4 mg by mouth daily, Disp: , Rfl:     venlafaxine (EFFEXOR-XR) 75 mg 24 hr capsule, Take 75 mg by mouth daily, Disp: , Rfl:     ALPRAZolam (XANAX) 0.25 mg tablet, , Disp: , Rfl:     amLODIPine Besylate (NORVASC PO), Take by mouth, Disp: , Rfl:     aspirin 81 MG tablet, Take by mouth, Disp: , Rfl:     azithromycin (AZASITE) 1 % ophthalmic solution, Apply 1 ggt in eye(s) bid x2 days, then qd x5 days, Disp: 2.5 mL, Rfl: 0    ciprofloxacin (CIPRO) 500 mg tablet, , Disp: , Rfl:     diphenhydrAMINE (BENADRYL) 25 mg capsule, Take 25 mg by mouth every 6 (six) hours as needed, Disp: , Rfl:     diphenhydramine, lidocaine, Al/Mg hydroxide, simethicone (Magic Mouthwash) SUSP, Swish and spit 10 mL every 4 (four) hours as needed for mouth pain or discomfort (Patient not taking: Reported on 2/20/2025), Disp: 119 mL, Rfl: 0    Docusate Sodium (DSS) 100 MG CAPS, Take 100 mg by mouth 2 (two) times a day (Patient not taking: Reported on 7/23/2023), Disp: , Rfl:     erythromycin (ILOTYCIN) ophthalmic ointment, Administer 0.5 inches to both eyes every 4 (four) hours, Disp: 3.5 g, Rfl: 0    esomeprazole (NexIUM) 40 MG  capsule, Take by mouth (Patient not taking: Reported on 3/29/2022), Disp: , Rfl:     fluticasone (FLONASE) 50 mcg/act nasal spray, 1 spray into each nostril 2 (two) times a day (Patient not taking: Reported on 2/20/2025), Disp: 11.1 mL, Rfl: 0    loratadine (CLARITIN) 10 mg tablet, Take 1 tablet (10 mg total) by mouth daily (Patient not taking: Reported on 2/20/2025), Disp: 30 tablet, Rfl: 0    olopatadine (PATANOL) 0.1 % ophthalmic solution, Administer 1 drop to both eyes 2 (two) times a day, Disp: 5 mL, Rfl: 0    phenazopyridine (PYRIDIUM) 100 mg tablet, Take 1 tablet (100 mg total) by mouth 3 (three) times a day as needed for bladder spasms, Disp: 10 tablet, Rfl: 0    pramipexole (MIRAPEX) 0.25 mg tablet, Take by mouth, Disp: , Rfl:     predniSONE 10 mg tablet, TAKE 4 TAB PO DAILY X 3 DAYS, THEN 3 TAB DAILY X 2 DAYS, THEN 2 TAB DAILY X 2 DAYS, THEN 1 TAB DAILY X 2 DAYS, WITH FOOD (Patient not taking: No sig reported), Disp: 24 tablet, Rfl: 0    sulfacetamide-prednisolone (BLEPHAMIDE) 10-0.2 % ophthalmic suspension, Administer 1 drop to both eyes every 3 (three) hours (Patient not taking: No sig reported), Disp: 10 mL, Rfl: 0    tamoxifen (NOLVADEX) 20 mg tablet, TAIKE 1 TABLET BY MOUTH DAILY (Patient not taking: Reported on 10/27/2022), Disp: , Rfl:     Current Allergies     Allergies as of 02/20/2025 - Reviewed 02/20/2025   Allergen Reaction Noted    Celecoxib  09/19/2016    Neomycin-bacitracin zn-polymyx Other (See Comments) 09/18/2014    Rofecoxib  09/18/2014    Tape  [medical tape]  09/18/2014    Tetanus antitoxin  09/18/2014            The following portions of the patient's history were reviewed and updated as appropriate: allergies, current medications, past family history, past medical history, past social history, past surgical history and problem list.     Past Medical History:   Diagnosis Date    HL (hearing loss)        Past Surgical History:   Procedure Laterality Date    CHOLECYSTECTOMY      MAMMO  "NEEDLE LOCALIZATION RIGHT (ALL INC) Right 02/27/2015    MASTECTOMY Right 2016       Family History   Problem Relation Age of Onset    Arthritis Mother     Heart disease Father     Colon cancer Maternal Grandfather          Medications have been verified.        Objective   /79   Pulse 78   Temp 97.8 °F (36.6 °C)   Resp 18   Ht 5' 6.5\" (1.689 m)   Wt 74.4 kg (164 lb)   SpO2 97%   BMI 26.07 kg/m²        Physical Exam     Physical Exam  Vitals reviewed.   Constitutional:       General: She is awake.   HENT:      Head: Normocephalic.   Cardiovascular:      Rate and Rhythm: Normal rate.   Pulmonary:      Breath sounds: Normal breath sounds. No decreased breath sounds, wheezing, rhonchi or rales.   Abdominal:      Tenderness: There is no right CVA tenderness or left CVA tenderness.   Skin:     General: Skin is warm and dry.   Neurological:      General: No focal deficit present.      Mental Status: She is alert.   Psychiatric:         Behavior: Behavior is cooperative.                   "

## 2025-02-20 NOTE — PATIENT INSTRUCTIONS
"Keflex twice a day for 7 days   Increase fluid intake   Tylenol as needed for pain or fever  Follow up with your PCP for worsening symptoms     Patient Education     Urinary tract infection - Discharge instructions   The Basics   Written by the doctors and editors at [facility]   What are discharge instructions? -- Discharge instructions are information about how to take care of yourself after getting medical care for a health problem.  What is a urinary tract infection? -- A urinary tract infection (\"UTI\") is an infection that affects either the bladder or the kidneys (figure 1). A kidney infection is more serious, and can lead to other serious problems if it is not treated properly.  You need to take antibiotics to treat a UTI. It is important to take all of your antibiotics, even if you start to feel better.  How do I care for myself at home? -- Ask the doctor or nurse what you should do when you go home. Make sure that you understand exactly what you need to do to care for yourself. Ask questions if there is anything you do not understand.  You should also:   Take all of your medicines as instructed.   Take phenazopyridine (sample brand name: AZO Urinary Pain Relief) for the first day or so, if you choose. This is an over-the-counter medicine. It will help numb your bladder and decrease the urge to urinate. This medicine causes your urine and tears to look orange.   Take acetaminophen (sample brand name: Tylenol) if needed for pain.   Drink extra fluids. This can help prevent more bladder infections. If you have sex, these things might also help:   Urinate right afterward.   If you use birth control, use a form that does not contain spermicide.  When should I call the doctor? -- Call for advice if:   You have pain in your back, shoulder, or belly.   You have a fever, shaking chills, or sweats even though you are taking antibiotics.   You notice more blood in your urine.   Your symptoms get worse or do not get " better within 24 hours of starting antibiotics.   Your symptoms come back after finishing treatment.   You have any new or worrying symptoms.  All topics are updated as new evidence becomes available and our peer review process is complete.  This topic retrieved from [employer] on: Feb 26, 2024.  Topic 403060 Version 1.0  Release: 32.2.4 - C32.56  © 2024 UpToDate, Inc. and/or its affiliates. All rights reserved.  figure 1: Anatomy of the urinary tract     Urine is made by the kidneys. It passes from the kidneys into the bladder through 2 tubes called the ureters. Then, it leaves the bladder through another tube called the urethra.  Graphic 24593 Version 8.0  Consumer Information Use and Disclaimer   Disclaimer: This generalized information is a limited summary of diagnosis, treatment, and/or medication information. It is not meant to be comprehensive and should be used as a tool to help the user understand and/or assess potential diagnostic and treatment options. It does NOT include all information about conditions, treatments, medications, side effects, or risks that may apply to a specific patient. It is not intended to be medical advice or a substitute for the medical advice, diagnosis, or treatment of a health care provider based on the health care provider's examination and assessment of a patient's specific and unique circumstances. Patients must speak with a health care provider for complete information about their health, medical questions, and treatment options, including any risks or benefits regarding use of medications. This information does not endorse any treatments or medications as safe, effective, or approved for treating a specific patient. UpToDate, Inc. and its affiliates disclaim any warranty or liability relating to this information or the use thereof.The use of this information is governed by the Terms of Use, available at https://www.[employer].com/en/know/clinical-effectiveness-terms. 2024©  [employer] and its affiliates and/or licensors. All rights reserved.  Copyright   © 2024 [employer] and/or its affiliates. All rights reserved.

## 2025-02-21 ENCOUNTER — RESULTS FOLLOW-UP (OUTPATIENT)
Dept: URGENT CARE | Facility: CLINIC | Age: 75
End: 2025-02-21

## 2025-02-22 ENCOUNTER — TELEPHONE (OUTPATIENT)
Dept: URGENT CARE | Facility: CLINIC | Age: 75
End: 2025-02-22

## 2025-02-22 DIAGNOSIS — N30.01 ACUTE CYSTITIS WITH HEMATURIA: Primary | ICD-10-CM

## 2025-02-22 RX ORDER — NITROFURANTOIN 25; 75 MG/1; MG/1
100 CAPSULE ORAL 2 TIMES DAILY
Qty: 10 CAPSULE | Refills: 0 | Status: SHIPPED | OUTPATIENT
Start: 2025-02-22 | End: 2025-02-27

## 2025-02-22 NOTE — TELEPHONE ENCOUNTER
Urine culture shows + growth of E. Coli, however shows resistance to Cefazolin. Patient was prescribed Keflex. Patient states she is still experiencing some UTI symptoms and her urine still appears pink, therefore the infection may not be properly responding to the Keflex. I have instructed the patient to discontinue the Keflex, and I have prescribed Macrobid x 5 days, to be completed as directed. Patient is to follow up w/ her PCP office for re-check after completing the antibiotic. Patient was informed that if her symptoms persist despite treatment, worsen or any new symptoms present, she is to be seen in the ER. Patient verbalizes understanding and agrees w/ the plan.

## 2025-02-23 LAB
BACTERIA UR CULT: ABNORMAL
BACTERIA UR CULT: ABNORMAL

## 2025-03-07 ENCOUNTER — APPOINTMENT (OUTPATIENT)
Dept: LAB | Facility: CLINIC | Age: 75
End: 2025-03-07
Payer: COMMERCIAL

## 2025-03-07 ENCOUNTER — TRANSCRIBE ORDERS (OUTPATIENT)
Dept: LAB | Facility: CLINIC | Age: 75
End: 2025-03-07

## 2025-03-07 DIAGNOSIS — Z17.0 ESTROGEN RECEPTOR POSITIVE: ICD-10-CM

## 2025-03-07 DIAGNOSIS — R35.0 URINARY FREQUENCY: ICD-10-CM

## 2025-03-07 DIAGNOSIS — C50.511 MALIGNANT NEOPLASM OF LOWER-OUTER QUADRANT OF RIGHT BREAST OF FEMALE, ESTROGEN RECEPTOR POSITIVE (HCC): Primary | ICD-10-CM

## 2025-03-07 DIAGNOSIS — Z17.0 MALIGNANT NEOPLASM OF LOWER-OUTER QUADRANT OF RIGHT BREAST OF FEMALE, ESTROGEN RECEPTOR POSITIVE (HCC): Primary | ICD-10-CM

## 2025-03-07 DIAGNOSIS — R30.0 DYSURIA: ICD-10-CM

## 2025-03-07 DIAGNOSIS — C50.511 MALIGNANT NEOPLASM OF LOWER-OUTER QUADRANT OF RIGHT BREAST OF FEMALE, ESTROGEN RECEPTOR POSITIVE (HCC): ICD-10-CM

## 2025-03-07 DIAGNOSIS — Z17.0 MALIGNANT NEOPLASM OF LOWER-OUTER QUADRANT OF RIGHT BREAST OF FEMALE, ESTROGEN RECEPTOR POSITIVE (HCC): ICD-10-CM

## 2025-03-07 LAB
ALBUMIN SERPL BCG-MCNC: 4.4 G/DL (ref 3.5–5)
ALP SERPL-CCNC: 53 U/L (ref 34–104)
ALT SERPL W P-5'-P-CCNC: 21 U/L (ref 7–52)
ANION GAP SERPL CALCULATED.3IONS-SCNC: 6 MMOL/L (ref 4–13)
AST SERPL W P-5'-P-CCNC: 23 U/L (ref 13–39)
BACTERIA UR QL AUTO: ABNORMAL /HPF
BASOPHILS # BLD AUTO: 0.04 THOUSANDS/ÂΜL (ref 0–0.1)
BASOPHILS NFR BLD AUTO: 1 % (ref 0–1)
BILIRUB SERPL-MCNC: 0.52 MG/DL (ref 0.2–1)
BILIRUB UR QL STRIP: NEGATIVE
BUN SERPL-MCNC: 14 MG/DL (ref 5–25)
CALCIUM SERPL-MCNC: 10.3 MG/DL (ref 8.4–10.2)
CAOX CRY URNS QL MICRO: ABNORMAL /HPF
CHLORIDE SERPL-SCNC: 103 MMOL/L (ref 96–108)
CLARITY UR: CLEAR
CO2 SERPL-SCNC: 28 MMOL/L (ref 21–32)
COLOR UR: ABNORMAL
CREAT SERPL-MCNC: 0.81 MG/DL (ref 0.6–1.3)
EOSINOPHIL # BLD AUTO: 0.22 THOUSAND/ÂΜL (ref 0–0.61)
EOSINOPHIL NFR BLD AUTO: 4 % (ref 0–6)
ERYTHROCYTE [DISTWIDTH] IN BLOOD BY AUTOMATED COUNT: 13.8 % (ref 11.6–15.1)
GFR SERPL CREATININE-BSD FRML MDRD: 72 ML/MIN/1.73SQ M
GLUCOSE P FAST SERPL-MCNC: 93 MG/DL (ref 65–99)
GLUCOSE UR STRIP-MCNC: NEGATIVE MG/DL
HCT VFR BLD AUTO: 40.9 % (ref 34.8–46.1)
HGB BLD-MCNC: 13.1 G/DL (ref 11.5–15.4)
HGB UR QL STRIP.AUTO: NEGATIVE
HYALINE CASTS #/AREA URNS LPF: ABNORMAL /LPF
IMM GRANULOCYTES # BLD AUTO: 0.02 THOUSAND/UL (ref 0–0.2)
IMM GRANULOCYTES NFR BLD AUTO: 0 % (ref 0–2)
KETONES UR STRIP-MCNC: NEGATIVE MG/DL
LEUKOCYTE ESTERASE UR QL STRIP: ABNORMAL
LYMPHOCYTES # BLD AUTO: 2.04 THOUSANDS/ÂΜL (ref 0.6–4.47)
LYMPHOCYTES NFR BLD AUTO: 33 % (ref 14–44)
MCH RBC QN AUTO: 28.1 PG (ref 26.8–34.3)
MCHC RBC AUTO-ENTMCNC: 32 G/DL (ref 31.4–37.4)
MCV RBC AUTO: 88 FL (ref 82–98)
MONOCYTES # BLD AUTO: 0.29 THOUSAND/ÂΜL (ref 0.17–1.22)
MONOCYTES NFR BLD AUTO: 5 % (ref 4–12)
MUCOUS THREADS UR QL AUTO: ABNORMAL
NEUTROPHILS # BLD AUTO: 3.64 THOUSANDS/ÂΜL (ref 1.85–7.62)
NEUTS SEG NFR BLD AUTO: 57 % (ref 43–75)
NITRITE UR QL STRIP: NEGATIVE
NON-SQ EPI CELLS URNS QL MICRO: ABNORMAL /HPF
NRBC BLD AUTO-RTO: 0 /100 WBCS
PH UR STRIP.AUTO: 5.5 [PH]
PLATELET # BLD AUTO: 129 THOUSANDS/UL (ref 149–390)
PMV BLD AUTO: 12.6 FL (ref 8.9–12.7)
POTASSIUM SERPL-SCNC: 4.5 MMOL/L (ref 3.5–5.3)
PROT SERPL-MCNC: 7.4 G/DL (ref 6.4–8.4)
PROT UR STRIP-MCNC: ABNORMAL MG/DL
RBC # BLD AUTO: 4.66 MILLION/UL (ref 3.81–5.12)
RBC #/AREA URNS AUTO: ABNORMAL /HPF
SODIUM SERPL-SCNC: 137 MMOL/L (ref 135–147)
SP GR UR STRIP.AUTO: 1.02 (ref 1–1.03)
UROBILINOGEN UR STRIP-ACNC: <2 MG/DL
WBC # BLD AUTO: 6.25 THOUSAND/UL (ref 4.31–10.16)
WBC #/AREA URNS AUTO: ABNORMAL /HPF

## 2025-03-07 PROCEDURE — 87086 URINE CULTURE/COLONY COUNT: CPT

## 2025-03-07 PROCEDURE — 87077 CULTURE AEROBIC IDENTIFY: CPT

## 2025-03-07 PROCEDURE — 80053 COMPREHEN METABOLIC PANEL: CPT

## 2025-03-07 PROCEDURE — 85025 COMPLETE CBC W/AUTO DIFF WBC: CPT

## 2025-03-07 PROCEDURE — 87186 SC STD MICRODIL/AGAR DIL: CPT

## 2025-03-07 PROCEDURE — 87147 CULTURE TYPE IMMUNOLOGIC: CPT

## 2025-03-07 PROCEDURE — 36415 COLL VENOUS BLD VENIPUNCTURE: CPT

## 2025-03-07 PROCEDURE — 81001 URINALYSIS AUTO W/SCOPE: CPT

## 2025-03-09 LAB
BACTERIA UR CULT: ABNORMAL
BACTERIA UR CULT: ABNORMAL

## 2025-03-17 ENCOUNTER — TRANSCRIBE ORDERS (OUTPATIENT)
Dept: LAB | Facility: CLINIC | Age: 75
End: 2025-03-17

## 2025-03-17 ENCOUNTER — APPOINTMENT (OUTPATIENT)
Dept: LAB | Facility: CLINIC | Age: 75
End: 2025-03-17
Payer: COMMERCIAL

## 2025-03-17 DIAGNOSIS — I48.0 PAROXYSMAL ATRIAL FIBRILLATION (HCC): ICD-10-CM

## 2025-03-17 DIAGNOSIS — I10 ESSENTIAL HYPERTENSION, MALIGNANT: ICD-10-CM

## 2025-03-17 DIAGNOSIS — E78.2 MIXED HYPERLIPIDEMIA: ICD-10-CM

## 2025-03-17 DIAGNOSIS — E11.65 INADEQUATELY CONTROLLED DIABETES MELLITUS (HCC): ICD-10-CM

## 2025-03-17 DIAGNOSIS — E11.65 INADEQUATELY CONTROLLED DIABETES MELLITUS (HCC): Primary | ICD-10-CM

## 2025-03-17 LAB
ALBUMIN SERPL BCG-MCNC: 4.4 G/DL (ref 3.5–5)
ALP SERPL-CCNC: 46 U/L (ref 34–104)
ALT SERPL W P-5'-P-CCNC: 17 U/L (ref 7–52)
ANION GAP SERPL CALCULATED.3IONS-SCNC: 9 MMOL/L (ref 4–13)
AST SERPL W P-5'-P-CCNC: 19 U/L (ref 13–39)
BASOPHILS # BLD AUTO: 0.04 THOUSANDS/ÂΜL (ref 0–0.1)
BASOPHILS NFR BLD AUTO: 1 % (ref 0–1)
BILIRUB SERPL-MCNC: 0.87 MG/DL (ref 0.2–1)
BUN SERPL-MCNC: 20 MG/DL (ref 5–25)
CALCIUM SERPL-MCNC: 10.2 MG/DL (ref 8.4–10.2)
CHLORIDE SERPL-SCNC: 103 MMOL/L (ref 96–108)
CHOLEST SERPL-MCNC: 112 MG/DL (ref ?–200)
CO2 SERPL-SCNC: 25 MMOL/L (ref 21–32)
CREAT SERPL-MCNC: 0.71 MG/DL (ref 0.6–1.3)
DIGOXIN SERPL-MCNC: 0.8 NG/ML (ref 0.8–2)
EOSINOPHIL # BLD AUTO: 0.34 THOUSAND/ÂΜL (ref 0–0.61)
EOSINOPHIL NFR BLD AUTO: 5 % (ref 0–6)
ERYTHROCYTE [DISTWIDTH] IN BLOOD BY AUTOMATED COUNT: 14 % (ref 11.6–15.1)
EST. AVERAGE GLUCOSE BLD GHB EST-MCNC: 105 MG/DL
GFR SERPL CREATININE-BSD FRML MDRD: 85 ML/MIN/1.73SQ M
GLUCOSE P FAST SERPL-MCNC: 94 MG/DL (ref 65–99)
HBA1C MFR BLD: 5.3 %
HCT VFR BLD AUTO: 39.2 % (ref 34.8–46.1)
HDLC SERPL-MCNC: 48 MG/DL
HGB BLD-MCNC: 12.3 G/DL (ref 11.5–15.4)
IMM GRANULOCYTES # BLD AUTO: 0.02 THOUSAND/UL (ref 0–0.2)
IMM GRANULOCYTES NFR BLD AUTO: 0 % (ref 0–2)
LDLC SERPL CALC-MCNC: 48 MG/DL (ref 0–100)
LYMPHOCYTES # BLD AUTO: 1.91 THOUSANDS/ÂΜL (ref 0.6–4.47)
LYMPHOCYTES NFR BLD AUTO: 27 % (ref 14–44)
MCH RBC QN AUTO: 28 PG (ref 26.8–34.3)
MCHC RBC AUTO-ENTMCNC: 31.4 G/DL (ref 31.4–37.4)
MCV RBC AUTO: 89 FL (ref 82–98)
MONOCYTES # BLD AUTO: 0.36 THOUSAND/ÂΜL (ref 0.17–1.22)
MONOCYTES NFR BLD AUTO: 5 % (ref 4–12)
NEUTROPHILS # BLD AUTO: 4.33 THOUSANDS/ÂΜL (ref 1.85–7.62)
NEUTS SEG NFR BLD AUTO: 62 % (ref 43–75)
NONHDLC SERPL-MCNC: 64 MG/DL
NRBC BLD AUTO-RTO: 0 /100 WBCS
PLATELET # BLD AUTO: 109 THOUSANDS/UL (ref 149–390)
PMV BLD AUTO: 12.9 FL (ref 8.9–12.7)
POTASSIUM SERPL-SCNC: 4.1 MMOL/L (ref 3.5–5.3)
PROT SERPL-MCNC: 7.3 G/DL (ref 6.4–8.4)
RBC # BLD AUTO: 4.4 MILLION/UL (ref 3.81–5.12)
SODIUM SERPL-SCNC: 137 MMOL/L (ref 135–147)
TRIGL SERPL-MCNC: 82 MG/DL (ref ?–150)
VIT B12 SERPL-MCNC: 1454 PG/ML (ref 180–914)
WBC # BLD AUTO: 7 THOUSAND/UL (ref 4.31–10.16)

## 2025-03-17 PROCEDURE — 83036 HEMOGLOBIN GLYCOSYLATED A1C: CPT

## 2025-03-17 PROCEDURE — 36415 COLL VENOUS BLD VENIPUNCTURE: CPT

## 2025-03-17 PROCEDURE — 80162 ASSAY OF DIGOXIN TOTAL: CPT

## 2025-03-17 PROCEDURE — 82607 VITAMIN B-12: CPT

## 2025-03-17 PROCEDURE — 80061 LIPID PANEL: CPT

## 2025-03-17 PROCEDURE — 80053 COMPREHEN METABOLIC PANEL: CPT

## 2025-03-17 PROCEDURE — 85025 COMPLETE CBC W/AUTO DIFF WBC: CPT

## 2025-03-27 ENCOUNTER — APPOINTMENT (OUTPATIENT)
Dept: LAB | Facility: HOSPITAL | Age: 75
End: 2025-03-27
Attending: INTERNAL MEDICINE
Payer: COMMERCIAL

## 2025-03-27 ENCOUNTER — HOSPITAL ENCOUNTER (OUTPATIENT)
Dept: ULTRASOUND IMAGING | Facility: HOSPITAL | Age: 75
End: 2025-03-27
Attending: INTERNAL MEDICINE
Payer: COMMERCIAL

## 2025-03-27 DIAGNOSIS — N30.01 ACUTE HEMORRHAGIC CYSTITIS: ICD-10-CM

## 2025-03-27 DIAGNOSIS — N95.0 POSTMENOPAUSAL BLEEDING: ICD-10-CM

## 2025-03-27 DIAGNOSIS — N30.01 ACUTE CYSTITIS WITH HEMATURIA: ICD-10-CM

## 2025-03-27 LAB
BACTERIA UR QL AUTO: ABNORMAL /HPF
BILIRUB UR QL STRIP: NEGATIVE
CLARITY UR: CLEAR
COLOR UR: ABNORMAL
GLUCOSE UR STRIP-MCNC: NEGATIVE MG/DL
HGB UR QL STRIP.AUTO: NEGATIVE
KETONES UR STRIP-MCNC: NEGATIVE MG/DL
LEUKOCYTE ESTERASE UR QL STRIP: NEGATIVE
NITRITE UR QL STRIP: NEGATIVE
NON-SQ EPI CELLS URNS QL MICRO: ABNORMAL /HPF
PH UR STRIP.AUTO: 6 [PH]
PROT UR STRIP-MCNC: NEGATIVE MG/DL
RBC #/AREA URNS AUTO: ABNORMAL /HPF
SP GR UR STRIP.AUTO: <=1.005 (ref 1–1.03)
UROBILINOGEN UR QL STRIP.AUTO: 0.2 E.U./DL
WBC #/AREA URNS AUTO: ABNORMAL /HPF

## 2025-03-27 PROCEDURE — 76775 US EXAM ABDO BACK WALL LIM: CPT

## 2025-03-27 PROCEDURE — 76856 US EXAM PELVIC COMPLETE: CPT

## 2025-03-27 PROCEDURE — 81001 URINALYSIS AUTO W/SCOPE: CPT

## 2025-03-27 PROCEDURE — 87086 URINE CULTURE/COLONY COUNT: CPT

## 2025-03-27 PROCEDURE — 76830 TRANSVAGINAL US NON-OB: CPT

## 2025-03-28 LAB — BACTERIA UR CULT: NORMAL

## 2025-04-25 NOTE — PROGRESS NOTES
Annual Exam Pre-charting    Last Annual Exam: 2021    Last PAP/HPV Test and Result: 2018, PAP neg    Last Mammo Screenin2024    Last STD Culture Screening: unknown    Current BC Method: menopause

## 2025-04-28 ENCOUNTER — OFFICE VISIT (OUTPATIENT)
Dept: OBGYN CLINIC | Facility: CLINIC | Age: 75
End: 2025-04-28
Payer: COMMERCIAL

## 2025-04-28 VITALS
WEIGHT: 161.5 LBS | BODY MASS INDEX: 25.96 KG/M2 | HEIGHT: 66 IN | SYSTOLIC BLOOD PRESSURE: 134 MMHG | DIASTOLIC BLOOD PRESSURE: 62 MMHG

## 2025-04-28 DIAGNOSIS — Z01.419 WOMEN'S ANNUAL ROUTINE GYNECOLOGICAL EXAMINATION: Primary | ICD-10-CM

## 2025-04-28 PROCEDURE — S0610 ANNUAL GYNECOLOGICAL EXAMINA: HCPCS | Performed by: OBSTETRICS & GYNECOLOGY

## 2025-04-28 RX ORDER — SODIUM PHOSPHATE,MONO-DIBASIC 19G-7G/118
ENEMA (ML) RECTAL
COMMUNITY
Start: 1980-01-01

## 2025-04-28 RX ORDER — SPIRONOLACTONE 25 MG/1
1 TABLET ORAL DAILY
COMMUNITY
Start: 2025-03-18

## 2025-04-28 NOTE — PROGRESS NOTES
"Karla Crockett is a 74 y.o. female who presents for annual well woman exam.     No PMB   No hot flashes or night sweat  H/o abn pap with cryo   MGM breast cancer 65, patient had breast cancer right mastectomy  And declines genetic  Menstrual History:  OB History          6    Para   4    Term   4            AB   2    Living   4         SAB   2    IAB        Ectopic        Multiple        Live Births                      No LMP recorded. Patient is postmenopausal.       The following portions of the patient's history were reviewed and updated as appropriate: allergies, current medications, past family history, past medical history, past social history, past surgical history, and problem list.    Review of Systems  Review of Systems   Constitutional:  Negative for activity change, appetite change, chills, fatigue and fever.   Respiratory:  Negative for apnea, cough, chest tightness and shortness of breath.    Cardiovascular:  Negative for chest pain, palpitations and leg swelling.   Gastrointestinal:  Negative for abdominal pain, constipation, diarrhea, nausea and vomiting.   Genitourinary:  Positive for frequency and urgency. Negative for difficulty urinating, dysuria, flank pain and hematuria.        Vulvar itching irritation/incontinence   Neurological:  Negative for dizziness, seizures, syncope, light-headedness, numbness and headaches.   Psychiatric/Behavioral:  Negative for agitation and confusion.           Objective      /62 (BP Location: Left arm, Patient Position: Sitting, Cuff Size: Adult)   Ht 5' 6\" (1.676 m)   Wt 73.3 kg (161 lb 8 oz)   BMI 26.07 kg/m²     Physical Exam  Physical Exam  Genitourinary:      Genitourinary Comments: Erythema with mild leukoplakia noted recommend to return to office for biopsy area of the skin is thickened                General:   alert and oriented, in no acute distress, alert, appears stated age, and cooperative   Heart: regular rate " and rhythm, S1, S2 normal, no murmur, click, rub or gallop   Lungs: clear to auscultation bilaterally   Abdomen: soft, non-tender, without masses or organomegaly   Vulva: Erythema external genitalia perianal area noted  Normal urethra   Vagina: normal mucosa, normal discharge, mild cystocele   Cervix: no cervical motion tenderness and no lesions   Uterus: normal size   Adnexa:  Breast Exam:  normal adnexa  Left breast appear normal, no suspicious masses, no skin or nipple changes or axillary nodes, right breast exam was not performed patient has mastectomy with implant.         Patient has ultrasound done by her PCP with mild elevated endometrial thickness       Assessment      @well woman@ .  74-year-old female  Annual exam  Skin erythema well-demarcated perianal area chronic itching and chronic fat use recommend biopsy  Mildly elevated endometrial thickness on ultrasound  Had right breast cancer with mastectomy  Declined genetic testing  History of A-fib on Eliquis  Stress urinary incontinence with overactive bladder  Cystocele  History of normal Pap smear with cryo Pap being normal since   plan   No Pap needed  Diet/exercise  Calcium/vitamin D  Follow-up with oncology  Return to office for vulvar biopsy  Return to office for endometrial biopsy  Bladder diet and diary given to patient  We would follow-up on her bladder symptom at the time of her next visit as well   All questions answered.     There are no Patient Instructions on file for this visit.

## 2025-05-09 NOTE — PROGRESS NOTES
Pre-charting for Appointment      Reason for being seen today: EMB    Any current testing/imaging done prior to exam today:  03/27/25 pelvic u/s - endometrial thickening

## 2025-05-12 ENCOUNTER — PROCEDURE VISIT (OUTPATIENT)
Dept: OBGYN CLINIC | Facility: CLINIC | Age: 75
End: 2025-05-12
Payer: COMMERCIAL

## 2025-05-12 VITALS
HEIGHT: 66 IN | BODY MASS INDEX: 25.71 KG/M2 | WEIGHT: 160 LBS | SYSTOLIC BLOOD PRESSURE: 128 MMHG | DIASTOLIC BLOOD PRESSURE: 54 MMHG

## 2025-05-12 DIAGNOSIS — R93.89 ENDOMETRIAL THICKENING ON ULTRASOUND: ICD-10-CM

## 2025-05-12 DIAGNOSIS — N90.89 VULVAR LESION: ICD-10-CM

## 2025-05-12 PROCEDURE — 88342 IMHCHEM/IMCYTCHM 1ST ANTB: CPT | Performed by: PATHOLOGY

## 2025-05-12 PROCEDURE — 88305 TISSUE EXAM BY PATHOLOGIST: CPT | Performed by: PATHOLOGY

## 2025-05-12 PROCEDURE — 88312 SPECIAL STAINS GROUP 1: CPT | Performed by: PATHOLOGY

## 2025-05-12 PROCEDURE — 56605 BIOPSY OF VULVA/PERINEUM: CPT | Performed by: OBSTETRICS & GYNECOLOGY

## 2025-05-12 PROCEDURE — 58100 BIOPSY OF UTERUS LINING: CPT | Performed by: OBSTETRICS & GYNECOLOGY

## 2025-05-12 PROCEDURE — 88341 IMHCHEM/IMCYTCHM EA ADD ANTB: CPT | Performed by: PATHOLOGY

## 2025-05-12 NOTE — PROGRESS NOTES
"Assessment/Plan:     Diagnoses and all orders for this visit:    Endometrial thickening on ultrasound  -     Tissue Exam    Vulvar lesion  -     Tissue Exam        74-year-old female  Vulvar itching/irritation/lesion  Mildly elevated endometrial thickness on ultrasound  Had right breast cancer with mastectomy  Declined genetic testing  History of A-fib on Eliquis  Stress urinary incontinence with overactive bladder  Cystocele  History of normal Pap smear with cryo Pap being normal since   plan   No Pap needed  Diet/exercise  Calcium/vitamin D  Follow-up with oncology  Vulvar biopsy done today  Endometrial biopsy done today  Continue bladder diet and Kegel exercise  We will call patient with results  Return to office in 6 months for vulvar exam     Patient ID: Radha Crockett is a 74 y.o. female.    HPI  Patient seen evaluated presented office today for vulvar biopsy secondary to vulvar lesion and vulvar irritation/itching chronic    Patient also has pelvic ultrasound mildly elevated endometrial thickness noticed endometrial biopsy also is recommended  Both procedure reviewed and discussed with patient all patient questions answered patient was satisfied  The following portions of the patient's history were reviewed and updated as appropriate: allergies, current medications, past family history, past medical history, past social history, past surgical history and problem list.    Review of Systems      Objective:      /54 (BP Location: Left arm, Patient Position: Sitting, Cuff Size: Adult)   Ht 5' 6\" (1.676 m)   Wt 72.6 kg (160 lb)   BMI 25.82 kg/m²          Physical Exam  Genitourinary:              Biopsy    Date/Time: 5/12/2025 4:45 PM    Performed by: Timothy Mora MD  Authorized by: Timothy Mora MD    Universal Protocol:  Consent: Verbal consent obtained.  Risks and benefits: risks, benefits and alternatives were discussed  Consent given by: patient  Time out: Immediately prior to procedure a \"time " "out\" was called to verify the correct patient, procedure, equipment, support staff and site/side marked as required.  Patient understanding: patient states understanding of the procedure being performed  Patient consent: the patient's understanding of the procedure matches consent given  Procedure consent: procedure consent matches procedure scheduled  Patient identity confirmed: verbally with patient    Procedure Details - Lesion Biopsy:     Body area:  Anogenital    Anogenital location:  Vulva    Vaginal Lesion: Yes      Simple Excision: Yes      Biopsy method: punch biopsy      Biopsy tissue type: skin     Lidocaine gel was obtained after Betadine was obtained following that 1 cc of lidocaine was used for anesthesia 4 mm punch biopsy was used to obtain specimen obtained and sent to pathology silver nitrate followed by antibiotics ointment applied patient tolerated procedure well  Endometrial biopsy    Date/Time: 5/12/2025 4:45 PM    Performed by: Timothy Mora MD  Authorized by: Timothy Mora MD    Universal Protocol:  Consent: Verbal consent obtained.  Risks and benefits: risks, benefits and alternatives were discussed  Consent given by: patient  Time out: Immediately prior to procedure a \"time out\" was called to verify the correct patient, procedure, equipment, support staff and site/side marked as required.  Patient understanding: patient states understanding of the procedure being performed  Patient consent: the patient's understanding of the procedure matches consent given  Procedure consent: procedure consent matches procedure scheduled  Patient identity confirmed: verbally with patient    Procedure:     Procedure: endometrial biopsy with Pipelle      A bivalve speculum was placed in the vagina: yes      The cervix was dilated: yes      Uterus sounded: no      Specimen collected: specimen collected and sent to pathology      Patient tolerated procedure well with no complications: yes    Findings:     " Uterus size:  Non-gravid    Cervix: normal      Adnexa: normal

## 2025-05-20 ENCOUNTER — TELEPHONE (OUTPATIENT)
Age: 75
End: 2025-05-20

## 2025-05-20 PROCEDURE — 88341 IMHCHEM/IMCYTCHM EA ADD ANTB: CPT | Performed by: PATHOLOGY

## 2025-05-20 PROCEDURE — 88305 TISSUE EXAM BY PATHOLOGIST: CPT | Performed by: PATHOLOGY

## 2025-05-20 PROCEDURE — 88312 SPECIAL STAINS GROUP 1: CPT | Performed by: PATHOLOGY

## 2025-05-20 PROCEDURE — 88342 IMHCHEM/IMCYTCHM 1ST ANTB: CPT | Performed by: PATHOLOGY

## 2025-05-20 NOTE — TELEPHONE ENCOUNTER
Pt called in stating she saw her recent test results and has concerns. Advised results just came through today and to give provider 72 hours to review. She verbalized understanding.

## 2025-05-22 NOTE — TELEPHONE ENCOUNTER
Patient returning a call for the second time regarding results. Requesting to see if provider can review and advise. RN advised typically staff requests 72 hours time for provider to review results. However, now that several days have passed, RN will route to provider. Patient agreeable. No further questions.

## 2025-05-23 ENCOUNTER — RESULTS FOLLOW-UP (OUTPATIENT)
Dept: OBGYN CLINIC | Facility: CLINIC | Age: 75
End: 2025-05-23

## 2025-05-23 NOTE — TELEPHONE ENCOUNTER
I already spoke to the patient please give her appointment for preop consult and discuss management for lichen sclerosus

## 2025-05-23 NOTE — TELEPHONE ENCOUNTER
Spoke with patient who advised she already had a biopsy done.  She would like to discuss with provider and is requesting a phone call prior to scheduling.

## 2025-05-23 NOTE — TELEPHONE ENCOUNTER
Attempted to schedule appointment for patient, next available 7/23.  Warm transfer to  for further assistance.

## 2025-05-23 NOTE — TELEPHONE ENCOUNTER
Patient suggest endometrial/endocervical polyp but need more endometrium tissue recommended status post D&C need to come to the office for consult also vulvar biopsy suggest lichen sclerosis we can discuss management options at the time of the visit please give her appointment for consult and discussed results and management on both biopsy of her vulvar and endometrium

## 2025-05-29 ENCOUNTER — APPOINTMENT (OUTPATIENT)
Dept: LAB | Facility: CLINIC | Age: 75
End: 2025-05-29
Payer: COMMERCIAL

## 2025-05-29 DIAGNOSIS — N39.0 URINARY TRACT INFECTION WITHOUT HEMATURIA, SITE UNSPECIFIED: ICD-10-CM

## 2025-05-29 LAB
BACTERIA UR QL AUTO: ABNORMAL /HPF
BILIRUB UR QL STRIP: NEGATIVE
CLARITY UR: CLEAR
COLOR UR: COLORLESS
GLUCOSE UR STRIP-MCNC: NEGATIVE MG/DL
HGB UR QL STRIP.AUTO: ABNORMAL
KETONES UR STRIP-MCNC: NEGATIVE MG/DL
LEUKOCYTE ESTERASE UR QL STRIP: ABNORMAL
NITRITE UR QL STRIP: NEGATIVE
NON-SQ EPI CELLS URNS QL MICRO: ABNORMAL /HPF
PH UR STRIP.AUTO: 5.5 [PH]
PROT UR STRIP-MCNC: NEGATIVE MG/DL
RBC #/AREA URNS AUTO: ABNORMAL /HPF
SP GR UR STRIP.AUTO: 1.02 (ref 1–1.03)
UROBILINOGEN UR STRIP-ACNC: <2 MG/DL
WBC #/AREA URNS AUTO: ABNORMAL /HPF

## 2025-05-29 PROCEDURE — 81001 URINALYSIS AUTO W/SCOPE: CPT

## 2025-06-06 ENCOUNTER — TRANSCRIBE ORDERS (OUTPATIENT)
Dept: LAB | Facility: CLINIC | Age: 75
End: 2025-06-06

## 2025-06-06 ENCOUNTER — APPOINTMENT (OUTPATIENT)
Dept: LAB | Facility: CLINIC | Age: 75
End: 2025-06-06
Payer: COMMERCIAL

## 2025-06-06 DIAGNOSIS — C50.511 MALIGNANT NEOPLASM OF LOWER-OUTER QUADRANT OF RIGHT FEMALE BREAST, UNSPECIFIED ESTROGEN RECEPTOR STATUS (HCC): ICD-10-CM

## 2025-06-06 DIAGNOSIS — C50.511 MALIGNANT NEOPLASM OF LOWER-OUTER QUADRANT OF RIGHT FEMALE BREAST, UNSPECIFIED ESTROGEN RECEPTOR STATUS (HCC): Primary | ICD-10-CM

## 2025-06-06 DIAGNOSIS — Z92.23 HISTORY OF ESTROGEN THERAPY: ICD-10-CM

## 2025-06-06 DIAGNOSIS — Z17.0 ESTROGEN RECEPTOR POSITIVE: ICD-10-CM

## 2025-06-06 LAB
ALBUMIN SERPL BCG-MCNC: 4.2 G/DL (ref 3.5–5)
ALP SERPL-CCNC: 55 U/L (ref 34–104)
ALT SERPL W P-5'-P-CCNC: 21 U/L (ref 7–52)
ANION GAP SERPL CALCULATED.3IONS-SCNC: 7 MMOL/L (ref 4–13)
AST SERPL W P-5'-P-CCNC: 20 U/L (ref 13–39)
BASOPHILS # BLD AUTO: 0.05 THOUSANDS/ÂΜL (ref 0–0.1)
BASOPHILS NFR BLD AUTO: 1 % (ref 0–1)
BILIRUB SERPL-MCNC: 0.59 MG/DL (ref 0.2–1)
BUN SERPL-MCNC: 16 MG/DL (ref 5–25)
CALCIUM SERPL-MCNC: 9.8 MG/DL (ref 8.4–10.2)
CHLORIDE SERPL-SCNC: 107 MMOL/L (ref 96–108)
CO2 SERPL-SCNC: 27 MMOL/L (ref 21–32)
CREAT SERPL-MCNC: 0.84 MG/DL (ref 0.6–1.3)
EOSINOPHIL # BLD AUTO: 0.19 THOUSAND/ÂΜL (ref 0–0.61)
EOSINOPHIL NFR BLD AUTO: 3 % (ref 0–6)
ERYTHROCYTE [DISTWIDTH] IN BLOOD BY AUTOMATED COUNT: 13.6 % (ref 11.6–15.1)
GFR SERPL CREATININE-BSD FRML MDRD: 69 ML/MIN/1.73SQ M
GLUCOSE P FAST SERPL-MCNC: 99 MG/DL (ref 65–99)
HCT VFR BLD AUTO: 38.6 % (ref 34.8–46.1)
HGB BLD-MCNC: 12.4 G/DL (ref 11.5–15.4)
IMM GRANULOCYTES # BLD AUTO: 0.02 THOUSAND/UL (ref 0–0.2)
IMM GRANULOCYTES NFR BLD AUTO: 0 % (ref 0–2)
LYMPHOCYTES # BLD AUTO: 2.47 THOUSANDS/ÂΜL (ref 0.6–4.47)
LYMPHOCYTES NFR BLD AUTO: 32 % (ref 14–44)
MCH RBC QN AUTO: 28.5 PG (ref 26.8–34.3)
MCHC RBC AUTO-ENTMCNC: 32.1 G/DL (ref 31.4–37.4)
MCV RBC AUTO: 89 FL (ref 82–98)
MONOCYTES # BLD AUTO: 0.35 THOUSAND/ÂΜL (ref 0.17–1.22)
MONOCYTES NFR BLD AUTO: 5 % (ref 4–12)
NEUTROPHILS # BLD AUTO: 4.66 THOUSANDS/ÂΜL (ref 1.85–7.62)
NEUTS SEG NFR BLD AUTO: 59 % (ref 43–75)
NRBC BLD AUTO-RTO: 0 /100 WBCS
PLATELET # BLD AUTO: 118 THOUSANDS/UL (ref 149–390)
PMV BLD AUTO: 12.3 FL (ref 8.9–12.7)
POTASSIUM SERPL-SCNC: 4.2 MMOL/L (ref 3.5–5.3)
PROT SERPL-MCNC: 7.2 G/DL (ref 6.4–8.4)
RBC # BLD AUTO: 4.35 MILLION/UL (ref 3.81–5.12)
SODIUM SERPL-SCNC: 141 MMOL/L (ref 135–147)
WBC # BLD AUTO: 7.74 THOUSAND/UL (ref 4.31–10.16)

## 2025-06-06 PROCEDURE — 36415 COLL VENOUS BLD VENIPUNCTURE: CPT

## 2025-06-06 PROCEDURE — 85025 COMPLETE CBC W/AUTO DIFF WBC: CPT

## 2025-06-06 PROCEDURE — 80053 COMPREHEN METABOLIC PANEL: CPT

## 2025-07-24 ENCOUNTER — PREP FOR PROCEDURE (OUTPATIENT)
Dept: OBGYN CLINIC | Facility: CLINIC | Age: 75
End: 2025-07-24

## 2025-07-24 ENCOUNTER — OFFICE VISIT (OUTPATIENT)
Dept: OBGYN CLINIC | Facility: CLINIC | Age: 75
End: 2025-07-24
Payer: COMMERCIAL

## 2025-07-24 VITALS
BODY MASS INDEX: 26.68 KG/M2 | HEIGHT: 66 IN | WEIGHT: 166 LBS | DIASTOLIC BLOOD PRESSURE: 60 MMHG | SYSTOLIC BLOOD PRESSURE: 118 MMHG

## 2025-07-24 DIAGNOSIS — E13.9 DIABETES 1.5, MANAGED AS TYPE 2 (HCC): ICD-10-CM

## 2025-07-24 DIAGNOSIS — L28.0 LICHEN: Primary | ICD-10-CM

## 2025-07-24 PROCEDURE — 99214 OFFICE O/P EST MOD 30 MIN: CPT | Performed by: OBSTETRICS & GYNECOLOGY

## 2025-07-24 RX ORDER — BIOTIN 10000 MCG
CAPSULE ORAL
COMMUNITY

## 2025-07-24 RX ORDER — DOXYCYCLINE HYCLATE 100 MG
1 TABLET ORAL 2 TIMES DAILY
COMMUNITY
Start: 2025-06-06

## 2025-07-24 RX ORDER — SODIUM PHOSPHATE,MONO-DIBASIC 19G-7G/118
ENEMA (ML) RECTAL
COMMUNITY
Start: 1980-01-01

## 2025-07-24 RX ORDER — CICLOPIROX OLAMINE 7.7 MG/G
CREAM TOPICAL
COMMUNITY
Start: 2025-06-13

## 2025-07-24 RX ORDER — CLOBETASOL PROPIONATE 0.5 MG/G
OINTMENT TOPICAL 2 TIMES DAILY
Qty: 30 G | Refills: 0 | Status: SHIPPED | OUTPATIENT
Start: 2025-07-24

## 2025-07-24 RX ORDER — DIPHENHYDRAMINE HYDROCHLORIDE 25 MG/1
1 CAPSULE ORAL EVERY 4 HOURS PRN
COMMUNITY
Start: 2025-06-23

## 2025-07-24 RX ORDER — SPIRONOLACTONE 25 MG/5ML
SUSPENSION ORAL
COMMUNITY

## 2025-07-24 RX ORDER — PREDNISONE 20 MG/1
20 TABLET ORAL DAILY
COMMUNITY

## 2025-07-24 NOTE — PROGRESS NOTES
Assessment/Plan:     There are no diagnoses linked to this encounter.        \74-year-old female  Vulvar itching/irritation/lesion biopsy suggest lichen sclerosis  Mildly elevated endometrial thickness on ultrasound scant EMB suggest polyp  Had right breast cancer with mastectomy  Declined genetic testing  History of A-fib on Eliquis  Stress urinary incontinence with overactive bladder  Cystocele  History of normal Pap smear with cryo Pap being normal since   plan   No Pap needed  Diet/exercise  Calcium/vitamin D  We will proceed with hysteroscopy D&C evaluation of the endometrium cavity  Clobetasol/hygiene discussed with patient  Continue bladder diet and Kegel exercise  We will call patient with results  Return to office in 6 months for vulvar exam    Subjective:      Patient ID: Radha Crockett is a 74 y.o. female.    HPI  Patient seen evaluated presented office today for follow-up after endometrial biopsy and vulvar biopsy  Pathology result reviewed and discussed with patient  A. Endometrium, Endometrial biopsy:  - Scant strips of mostly superficial/ surface inactive endometrium. See note.  - Scant benign endocervical and squamous epithelium.  - Minute fragments of benign polyp, favor endocervical.     Note (A): The sections demonstrate scant fragments of mostly surface, inactive endometrium.  There is not sufficient intact endometrium showing endometrial glands with stroma to evaluate for hyperplasia or carcinoma.  Additional sampling is suggested if clinical or radiologic concern persists.      B. Vulva, Vulvar biopsy:  - Atypical verrucous and acanthotic squamous proliferation, superficial fragments. See note.  - Associated lichenoid dermatitis suggestive of background lichen sclerosus.    -- Confirmed by negative/patchy p16 and wild-type to slight increased (inflammatory type) basilar expression on p53 immunostains.    - Special stains for fungus (PAS) is negative.   - No invasive carcinoma identified.      "  Note (B):Sections reveal an atypical verrucous and acanthotic squamous proliferation, some fragments detached from underlying dermis, with hyperkeratosis, hypergranulosis and focal parakeratosis.  If this clinically is a discrete lesion, a vulvar aberrant maturation (VAM) such as verruciform lichen simplex chronicus (vLSC) should be considered. Background lichenoid dermatitis with features of lichen sclerosis is noted.      Based on the endometrial biopsy and finding of elevated endometrial thickness and suspicious of polyp I would recommend to proceed with hysteroscopy D&C evaluation of the endometrium cavity procedure explained and discussed with patient risk of bleeding, infection, blood transfusion, perforation, anesthesia, needing another surgery all explained and discussed with patient  Vulvar biopsy results reviewed and discussed with patient lichenoid dermatitis suggest background of lichen sclerosis management option reviewed and discussed with patient hygiene reviewed and discussed with patient as well as clobetasol to use twice weekly and return to office every 6 months for vulvar exam  The following portions of the patient's history were reviewed and updated as appropriate: allergies, current medications, past family history, past medical history, past social history, past surgical history and problem list.    Review of Systems      Objective:      /60 (BP Location: Left arm, Patient Position: Sitting, Cuff Size: Adult)   Ht 5' 6\" (1.676 m)   Wt 75.3 kg (166 lb)   BMI 26.79 kg/m²          Physical Exam  Constitutional:       Appearance: She is well-developed.     Cardiovascular:      Rate and Rhythm: Normal rate and regular rhythm.      Heart sounds: Normal heart sounds.   Pulmonary:      Effort: Pulmonary effort is normal.      Breath sounds: Normal breath sounds.   Abdominal:      General: There is no distension.      Palpations: Abdomen is soft.      Tenderness: There is no abdominal " tenderness.     Musculoskeletal:      Right lower leg: No edema.      Left lower leg: No edema.     Neurological:      Mental Status: She is alert and oriented to person, place, and time.     Psychiatric:         Behavior: Behavior normal.

## 2025-07-31 ENCOUNTER — HOSPITAL ENCOUNTER (OUTPATIENT)
Dept: CT IMAGING | Facility: HOSPITAL | Age: 75
Discharge: HOME | End: 2025-07-31
Attending: INTERNAL MEDICINE
Payer: COMMERCIAL

## 2025-07-31 ENCOUNTER — TELEPHONE (OUTPATIENT)
Dept: OBGYN CLINIC | Facility: CLINIC | Age: 75
End: 2025-07-31

## 2025-07-31 DIAGNOSIS — N13.30 UNSPECIFIED HYDRONEPHROSIS: ICD-10-CM

## 2025-07-31 PROCEDURE — 74176 CT ABD & PELVIS W/O CONTRAST: CPT

## 2025-08-21 DIAGNOSIS — L28.0 LICHEN: ICD-10-CM

## 2025-08-21 RX ORDER — CLOBETASOL PROPIONATE 0.5 MG/G
OINTMENT TOPICAL 2 TIMES DAILY
Qty: 30 G | Refills: 0 | Status: SHIPPED | OUTPATIENT
Start: 2025-08-21

## 2025-08-22 ENCOUNTER — TRANSCRIBE ORDERS (OUTPATIENT)
Dept: LAB | Facility: CLINIC | Age: 75
End: 2025-08-22

## 2025-08-22 DIAGNOSIS — Z17.0 ESTROGEN RECEPTOR POSITIVE: ICD-10-CM

## 2025-08-22 DIAGNOSIS — N30.00 ACUTE CYSTITIS WITHOUT HEMATURIA: Primary | ICD-10-CM

## 2025-08-22 DIAGNOSIS — C50.511 MALIGNANT NEOPLASM OF LOWER-OUTER QUADRANT OF RIGHT FEMALE BREAST, UNSPECIFIED ESTROGEN RECEPTOR STATUS (HCC): ICD-10-CM

## 2025-08-22 DIAGNOSIS — E78.2 MIXED HYPERLIPIDEMIA: ICD-10-CM

## 2025-08-22 DIAGNOSIS — E13.69 OTHER SPECIFIED DIABETES MELLITUS WITH OTHER SPECIFIED COMPLICATION, UNSPECIFIED WHETHER LONG TERM INSULIN USE (HCC): ICD-10-CM

## 2025-08-22 DIAGNOSIS — I48.0 PAROXYSMAL ATRIAL FIBRILLATION (HCC): ICD-10-CM

## 2025-08-22 DIAGNOSIS — Z79.01 LONG TERM (CURRENT) USE OF ANTICOAGULANTS: ICD-10-CM

## 2025-08-22 DIAGNOSIS — Z92.23 HISTORY OF ESTROGEN THERAPY: Primary | ICD-10-CM

## 2025-08-22 DIAGNOSIS — I10 ESSENTIAL HYPERTENSION, MALIGNANT: ICD-10-CM
